# Patient Record
Sex: FEMALE | Race: BLACK OR AFRICAN AMERICAN | NOT HISPANIC OR LATINO
[De-identification: names, ages, dates, MRNs, and addresses within clinical notes are randomized per-mention and may not be internally consistent; named-entity substitution may affect disease eponyms.]

---

## 2020-01-10 ENCOUNTER — RESULT REVIEW (OUTPATIENT)
Age: 25
End: 2020-01-10

## 2022-02-10 ENCOUNTER — NON-APPOINTMENT (OUTPATIENT)
Age: 27
End: 2022-02-10

## 2022-02-14 PROBLEM — Z00.00 ENCOUNTER FOR PREVENTIVE HEALTH EXAMINATION: Status: ACTIVE | Noted: 2022-02-14

## 2022-02-16 ENCOUNTER — APPOINTMENT (OUTPATIENT)
Dept: ORTHOPEDIC SURGERY | Facility: CLINIC | Age: 27
End: 2022-02-16
Payer: COMMERCIAL

## 2022-02-16 ENCOUNTER — NON-APPOINTMENT (OUTPATIENT)
Age: 27
End: 2022-02-16

## 2022-02-16 VITALS — SYSTOLIC BLOOD PRESSURE: 121 MMHG | HEART RATE: 86 BPM | DIASTOLIC BLOOD PRESSURE: 83 MMHG

## 2022-02-16 VITALS — HEIGHT: 61 IN | BODY MASS INDEX: 32.28 KG/M2 | WEIGHT: 171 LBS

## 2022-02-16 VITALS — TEMPERATURE: 98.3 F

## 2022-02-16 DIAGNOSIS — M75.102 UNSPECIFIED ROTATOR CUFF TEAR OR RUPTURE OF LEFT SHOULDER, NOT SPECIFIED AS TRAUMATIC: ICD-10-CM

## 2022-02-16 DIAGNOSIS — Z72.89 OTHER PROBLEMS RELATED TO LIFESTYLE: ICD-10-CM

## 2022-02-16 DIAGNOSIS — Z78.9 OTHER SPECIFIED HEALTH STATUS: ICD-10-CM

## 2022-02-16 PROCEDURE — 99204 OFFICE O/P NEW MOD 45 MIN: CPT

## 2022-02-16 PROCEDURE — 73030 X-RAY EXAM OF SHOULDER: CPT | Mod: LT

## 2022-02-16 RX ORDER — DICLOFENAC SODIUM 50 MG/1
50 TABLET, DELAYED RELEASE ORAL
Qty: 60 | Refills: 1 | Status: ACTIVE | OUTPATIENT
Start: 2022-02-16
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
## 2022-02-16 NOTE — PHYSICAL EXAM
[de-identified] : Physical Examination\par General: well nourished, in no acute distress, alert and oriented to person, place and time\par Psychiatric: normal mood and affect, no abnormal movements or speech patterns\par Eyes: vision intact without glasses\par \par Musculoskeletal Examination\par Cervical spine	Full painless range of motion and negative Spurling's test\par \par Shoulder			Right			Left\par Appearance\par      Skin/Swelling/Deformity	normal			normal\par      Scapular Winging		-			-\par Range of Motion\par      Forward Flexion		170 / 170		170 / 170\par      Abduction			170 / 170		170 / 170\par      External Rotation		60			60\par      Internal Rotation		T10			T10\par      SAbd Ext Rotation		90			90\par      SAbd Int Rotation		80			80\par      Painful Arc			-			-\par      Crepitus			-			-\par Palpation\par      Clavicle			-			-\par      AC Joint			-			-\par      Posterior Acromion		-			-\par      Levator Scapula		-			-\par      Lateral Bursa			-			+\par      Impingement Area		-			+\par      Biceps Tendon		-			-\par      Anterior Capsule		-			-\par Strength Examination\par      Supraspinatous 		5+ / 0			5+ / +\par      Infraspinatous			5+ / 0			5+ / +\par      Subscapularis			5+ / 0			5+ / 0\par      Belly Press			5+ / 0			5+ / 0\par      Lift Off			-			-\par      Drop-Arm			-			-\par Special Examination\par      Biceps Brazos's		-			-\par      Impingement Neer		-			=\par      Impingement Hawking		-			=\par \par      Apprehension			-			-\par           Suppression Appre		-			-\par      Anterior Subluxation		-			-\par      Posterior Subluxation		-			-\par      AC Cross-Body\par           Anterior			-			-\par           Posterior			-			-\par \par Sensation\par      Axillary			normal			normal\par      LatAntCubBrach 		normal			normal\par      Median 			normal			normal\par      Ulnar 			normal			normal\par      Radial 			normal			normal\par Motor\par      AIN 				normal			normal\par      Ulnar 			normal			normal\par      Radial 			normal			normal\par      PIN 				normal			normal\par Pulses\par      Radial			2+			2+ [de-identified] : 4 views of the affected left shoulder (AP, Glenoid, Y-View, Axillary)\par 10-26-21 and evaluated by myself today and\par demonstrate:\par normal bony calcification without dislocation and no fracture\par 	Arch	[2B]\par 	AC Joint	[No] Arthrosis\par 	GH Joint	[No] Arthrosis\par 	Calcifications	[none]\par \par \par 4 views of the affected left shoulder (AP, Glenoid, Y-View, Axillary)\par were ordered, obtained and evaluated by myself today and\par demonstrate:\par normal bony calcification without dislocation and no fracture\par 	Arch	[2B]\par 	AC Joint	[No] Arthrosis\par 	GH Joint	[No] Arthrosis\par 	Calcifications	[none]\par \par

## 2022-02-16 NOTE — DISCUSSION/SUMMARY
[de-identified] : left shoulder rotator cuff syndrome\par I discussed my findings on history, exam and radiology.\par \par I reviewed the anatomy and function of the shoulder rotator cuff muscles and tendons, biceps tendon and labrum.Given the current findings for the patient, I recommend proceeding with advanced imaging to better characterize and diagnose the problem to aid patient care, management and treatment guidance as I suspect an internal injury to the knee not diagnosed on non-diagnostic plain radiographs causing the patients symptoms and physical examination to help provide surgical management planning.\par \par Therefore given the patients continued symptoms despite [ Non-operative management ] the patient has continued pain and weakness and impaired sleep affecting the patient's ADLs and limiting activities negatively affecting the patient's quality of life, examination and history consistent with internal shoulder derangement or rotator cuff injury with weakness of the rotator cuff muscles along with severe pain I am recommending obtaining advanced MRI imaging of the shoulder to identify damaged structures in order to facilite preopertive planning. I discussed with the patient that I am ordering an MRI to assess the soft tissue structures in the joint such as the joint capsule, ligaments, rotator cuff and biceps tendons, as well as to assess the cartilage, muscle bellies, cysts, AC joint edema or other pathology. The test will need insurance approval and will take place at a [facility] facility or outside facility. If the patient elects to obtain the MRI from an outside facility, the patient understands they have been instructed to bring in both the final radiologist read and a CD/DVD with the MRI images to allow review of the imaging test by myself during the follow up visit. I do not recommend obtaining an open standing MRI as the quality of the images is subpar and makes it difficult to diagnose intra-articular derangements and guide care discussion and decision making.\par \par The patient was prescribed Diclofenac PO non-steroidal anti-inflammatory medication. 50mg tablets twice daily to be taken for at least 1-2 weeks in a row and then PRN afterwards. Risks and benefits were discussed and include but not limited to renal damage and GI ulceration and bleeding.  They were advised to take with food to limit stomach upset as well as warned to stop the medication if worsening gastric pain or dizziness or other side effects. Also to immediately stop the medication and seek appropriate medical attention if any severe stomach ache, gastritis, black/red vomit, black/red stools or any other medical concern.\par \par Procedure Note:\par \par Verbal consent was obtained for bursal corticosteroid injection of the LEFT shoulder subacromial bursa, after the risks and benefits were discussed with the patient. Potential adverse effects were discussed including but not limited to bleeding, skin/ joint infection, local skin reactions including bleaching, bruising, stiffness, soreness, vasovagal episodes, transient hyperglycemia, avascular necrosis, pseudo-septic type reactions, post injection joint pain, allergic reaction to product or anesthetic and other rare but potential adverse effects along with benefits including decreased pain and improved stability prior to obtaining verbal informed consent. It was also discussed that for some patients the treatment is ineffective and there are no guarantees that the patient will experience improvement as the result of the injection. In rare occasions the injection can cause worsening of pain.\par \par The use of a Sonosite 15-6 MHz linear transducer with live ultrasound guidance of the shoulder was necessary given the patient's BMI and local body habitus overlying and obscuring the identification of normal body bony anatomy used to identify the injection site, the depth of soft tissue envelope necessitating a longer than normal needle to reach the bursal space, and to confirm the location of the needle tip within the bursa and localize delivery of the medication. Without the use of live ultrasound guidance the injection would have been more difficult to appropriate place and ensure accurate delivery of medication and place the patient's anatomy structures at risk for injury.\par \par The arm was placed in an adducted and external rotated position and the arm supported by a bump. The posterolateral injection site was identified using the ultrasound probe longitudianlly to identify the acromion boarder, the tuberosity, the rotator cuff tendons and the bursal space with internal and external rotation of the arm. The injection site was marked and prepped with a ChloraPrep swab and anesthetized with ethylchloride skin anesthesia. Using sterile technique a 22g 1 1/2 in spinal needle with 3 cc total of 1cc 1% lidocaine without epinephrine, 1cc 0.25% Marcaine without epinephrine and 1cc of 40mg/mL Kenalog was passed through the injection site towards the previously identified bursal space under live ultrasound guidance and noted to tip to be located in the appropriate plane. The medication was injected without resistance under live ultrasound visualization and noted to flow into the bursal space with its expansion noted on live ultrasound. The injection site was sterilely dressed, there was minimal blood loss. The patient tolerated this procedure without any complications done by myself. Images were recorded and saved.\par \par The patient has been advised that if they notice any worsening of symptoms or any problems to contact me and seek care from a qualified medical professional. The patient was instructed to ice the shoulder and take NSAID medication on an as needed basis if the patient feels discomfort.\par \par \par The patient verifies their understanding the the visit, diagnosis and plan. They agree with the treatment plan and will contact the office with any questions or problems.\par \par FU after MRI completed

## 2024-02-27 ENCOUNTER — ASOB RESULT (OUTPATIENT)
Age: 29
End: 2024-02-27

## 2024-02-27 ENCOUNTER — APPOINTMENT (OUTPATIENT)
Dept: ANTEPARTUM | Facility: CLINIC | Age: 29
End: 2024-02-27
Payer: COMMERCIAL

## 2024-02-27 ENCOUNTER — LABORATORY RESULT (OUTPATIENT)
Age: 29
End: 2024-02-27

## 2024-02-27 PROCEDURE — 76813 OB US NUCHAL MEAS 1 GEST: CPT

## 2024-02-27 PROCEDURE — 36415 COLL VENOUS BLD VENIPUNCTURE: CPT

## 2024-02-27 PROCEDURE — 76801 OB US < 14 WKS SINGLE FETUS: CPT | Mod: 59

## 2024-03-07 ENCOUNTER — NON-APPOINTMENT (OUTPATIENT)
Age: 29
End: 2024-03-07

## 2024-03-25 ENCOUNTER — APPOINTMENT (OUTPATIENT)
Dept: MATERNAL FETAL MEDICINE | Facility: CLINIC | Age: 29
End: 2024-03-25
Payer: COMMERCIAL

## 2024-03-25 ENCOUNTER — ASOB RESULT (OUTPATIENT)
Age: 29
End: 2024-03-25

## 2024-03-25 PROCEDURE — 99205 OFFICE O/P NEW HI 60 MIN: CPT | Mod: 95

## 2024-04-08 ENCOUNTER — TRANSCRIPTION ENCOUNTER (OUTPATIENT)
Age: 29
End: 2024-04-08

## 2024-04-26 ENCOUNTER — APPOINTMENT (OUTPATIENT)
Dept: ANTEPARTUM | Facility: CLINIC | Age: 29
End: 2024-04-26
Payer: COMMERCIAL

## 2024-04-26 ENCOUNTER — ASOB RESULT (OUTPATIENT)
Age: 29
End: 2024-04-26

## 2024-04-26 PROCEDURE — 76811 OB US DETAILED SNGL FETUS: CPT

## 2024-05-08 ENCOUNTER — ASOB RESULT (OUTPATIENT)
Age: 29
End: 2024-05-08

## 2024-05-08 ENCOUNTER — APPOINTMENT (OUTPATIENT)
Dept: MATERNAL FETAL MEDICINE | Facility: CLINIC | Age: 29
End: 2024-05-08
Payer: COMMERCIAL

## 2024-05-08 PROCEDURE — 99214 OFFICE O/P EST MOD 30 MIN: CPT | Mod: 95

## 2024-05-10 ENCOUNTER — NON-APPOINTMENT (OUTPATIENT)
Age: 29
End: 2024-05-10

## 2024-06-05 ENCOUNTER — OUTPATIENT (OUTPATIENT)
Dept: OUTPATIENT SERVICES | Facility: HOSPITAL | Age: 29
LOS: 1 days | End: 2024-06-05
Payer: COMMERCIAL

## 2024-06-05 ENCOUNTER — APPOINTMENT (OUTPATIENT)
Dept: PEDIATRIC ALLERGY IMMUNOLOGY | Facility: CLINIC | Age: 29
End: 2024-06-05
Payer: COMMERCIAL

## 2024-06-05 DIAGNOSIS — Z87.19 PERSONAL HISTORY OF OTHER DISEASES OF THE DIGESTIVE SYSTEM: ICD-10-CM

## 2024-06-05 DIAGNOSIS — Z00.00 ENCOUNTER FOR GENERAL ADULT MEDICAL EXAMINATION WITHOUT ABNORMAL FINDINGS: ICD-10-CM

## 2024-06-05 DIAGNOSIS — Z11.3 ENCOUNTER FOR SCREENING FOR INFECTIONS WITH A PREDOMINANTLY SEXUAL MODE OF TRANSMISSION: ICD-10-CM

## 2024-06-05 DIAGNOSIS — Z29.9 ENCOUNTER FOR PROPHYLACTIC MEASURES, UNSPECIFIED: ICD-10-CM

## 2024-06-05 DIAGNOSIS — Z29.81 ENCOUNTER FOR HIV PRE-EXPOSURE PROPHYLAXIS: ICD-10-CM

## 2024-06-05 PROCEDURE — 85025 COMPLETE CBC W/AUTO DIFF WBC: CPT

## 2024-06-05 PROCEDURE — 80053 COMPREHEN METABOLIC PANEL: CPT

## 2024-06-05 PROCEDURE — 86696 HERPES SIMPLEX TYPE 2 TEST: CPT

## 2024-06-05 PROCEDURE — 36415 COLL VENOUS BLD VENIPUNCTURE: CPT

## 2024-06-05 PROCEDURE — 86780 TREPONEMA PALLIDUM: CPT

## 2024-06-05 PROCEDURE — 87491 CHLMYD TRACH DNA AMP PROBE: CPT

## 2024-06-05 PROCEDURE — 99205 OFFICE O/P NEW HI 60 MIN: CPT

## 2024-06-05 PROCEDURE — 86695 HERPES SIMPLEX TYPE 1 TEST: CPT

## 2024-06-05 PROCEDURE — 87591 N.GONORRHOEAE DNA AMP PROB: CPT

## 2024-06-05 PROCEDURE — G0463: CPT

## 2024-06-05 PROCEDURE — 87536 HIV-1 QUANT&REVRSE TRNSCRPJ: CPT

## 2024-06-05 PROCEDURE — 87389 HIV-1 AG W/HIV-1&-2 AB AG IA: CPT

## 2024-06-05 NOTE — HISTORY OF PRESENT ILLNESS
[FreeTextEntry1] : Patient presents to initiate PrEP   Patient is in a relationship with a man who is HIV+ Reportedly he is on medication and is undetectable She is 26 weeks pregnant with second baby, first just turned 5 VALENTE 9/10/2024 First was delivered via emergent  - she is hoping for  this time around  FOB is +  He has been undetectable on meds to her knowledge since their relationship began  Her last HIV test was this past weekend, and it was negative - AG/AB test  Her last sex with partner was in December, around the time she conceived, they had largely not been using condoms  H/o colitis  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

## 2024-06-05 NOTE — ASSESSMENT
[FreeTextEntry1] : This is a 28 year old cis female patient presenting at 26 weeks gestation. She is currently in a monogamous relationship with an HIV+ male who is reportedly undetectable on medication (he was perinatally infected). They have not been active since she found out she was pregnant in December, they agreed to this together in order to avoid any risk of transmission. She has been tested negative numerous times throughout her life including as recently as 4 days prior, with ag/ab testing (has result on her phone). Discussed risks, benefits, and alternatives, of taking PrEP while pregnant and./or breast feeding. Discussed other methods of risk reduction. At this time, the patient wishes to defer PrEP until after delivery, and continue to refrain from sexual activity. Patient partner is following up with his provider to ensure he remains undetectable. Discussed if she wishes to refrain from PrEP but engage in activity to use barrier methods. she verbalized understanding and will RTC in the fall after delivery to initiate PrEP. Repeat HIV testing today, amenable to other STI testing as well.  Provided health education/risk reduction services to the patient at todays visit includin Discussion of Sexual Health 330 Risk Reduction/Education 346 Health Education  Individual

## 2024-06-05 NOTE — SOCIAL HISTORY
[Sexually Active] : The patient is sexually active [Monogamous] : monogamous [Never] : never [Vaginal] : vaginal [HIV Risk Factors: Heterosexual contact] : with naproxen sodium

## 2024-06-05 NOTE — DISCUSSION/SUMMARY
[15 min] : 15 min [HIV Education] : HIV Education [Universal Precautions] : universal precautions [Treatment Education] : treatment education [Treatment Adherence] : treatment adherence [Anticipatory Guidance] : anticipatory guidance [Prognosis] : prognosis [Risk Reduction] : risk reduction [Pre-conception Counseling] : pre-conception counseling [Sexuality/Safer Sex] : sexuality/safer sex [Family Planning] : family planning

## 2024-06-06 LAB
ALBUMIN SERPL ELPH-MCNC: 3.4 G/DL
ALP BLD-CCNC: 121 U/L
ALT SERPL-CCNC: 6 U/L
ANION GAP SERPL CALC-SCNC: 11 MMOL/L
AST SERPL-CCNC: 10 U/L
BASOPHILS # BLD AUTO: 0.04 K/UL
BASOPHILS NFR BLD AUTO: 0.4 %
BILIRUB SERPL-MCNC: 0.2 MG/DL
BUN SERPL-MCNC: 9 MG/DL
CALCIUM SERPL-MCNC: 9 MG/DL
CHLORIDE SERPL-SCNC: 106 MMOL/L
CO2 SERPL-SCNC: 21 MMOL/L
CREAT SERPL-MCNC: 0.57 MG/DL
EGFR: 125 ML/MIN/1.73M2
EOSINOPHIL # BLD AUTO: 0.15 K/UL
EOSINOPHIL NFR BLD AUTO: 1.5 %
GLUCOSE SERPL-MCNC: 76 MG/DL
HCT VFR BLD CALC: 33 %
HGB BLD-MCNC: 10.8 G/DL
HIV1 RNA # SERPL NAA+PROBE: NORMAL
HIV1 RNA # SERPL NAA+PROBE: NORMAL COPIES/ML
HIV1+2 AB SPEC QL IA.RAPID: NONREACTIVE
HSV 1+2 IGG SER IA-IMP: NEGATIVE
HSV 1+2 IGG SER IA-IMP: POSITIVE
HSV1 IGG SER QL: 4.51 INDEX
HSV2 IGG SER QL: 0.04 INDEX
IMM GRANULOCYTES NFR BLD AUTO: 0.4 %
LYMPHOCYTES # BLD AUTO: 2.27 K/UL
LYMPHOCYTES NFR BLD AUTO: 22.3 %
MAN DIFF?: NORMAL
MCHC RBC-ENTMCNC: 29.6 PG
MCHC RBC-ENTMCNC: 32.7 GM/DL
MCV RBC AUTO: 90.4 FL
MONOCYTES # BLD AUTO: 0.61 K/UL
MONOCYTES NFR BLD AUTO: 6 %
NEUTROPHILS # BLD AUTO: 7.09 K/UL
NEUTROPHILS NFR BLD AUTO: 69.4 %
PLATELET # BLD AUTO: 233 K/UL
POTASSIUM SERPL-SCNC: 4 MMOL/L
PROT SERPL-MCNC: 6.3 G/DL
RBC # BLD: 3.65 M/UL
RBC # FLD: 13.3 %
SODIUM SERPL-SCNC: 138 MMOL/L
T PALLIDUM AB SER QL IA: NEGATIVE
VIRAL LOAD INTERP: NORMAL
VIRAL LOAD LOG: NORMAL LG COP/ML
WBC # FLD AUTO: 10.2 K/UL

## 2024-06-07 LAB
C TRACH RRNA SPEC QL NAA+PROBE: NOT DETECTED
C TRACH RRNA SPEC QL NAA+PROBE: NOT DETECTED
N GONORRHOEA RRNA SPEC QL NAA+PROBE: NOT DETECTED
N GONORRHOEA RRNA SPEC QL NAA+PROBE: NOT DETECTED
SOURCE AMPLIFICATION: NORMAL
SOURCE ORAL: NORMAL

## 2024-06-11 LAB
HSV 1 IGG TYPE-SPECIFIC AB: 4.54 INDEX
HSV 2 IGG TYPE-SPECIFIC AB: <0.9 INDEX

## 2024-06-13 DIAGNOSIS — Z11.3 ENCOUNTER FOR SCREENING FOR INFECTIONS WITH A PREDOMINANTLY SEXUAL MODE OF TRANSMISSION: ICD-10-CM

## 2024-06-13 DIAGNOSIS — Z29.9 ENCOUNTER FOR PROPHYLACTIC MEASURES, UNSPECIFIED: ICD-10-CM

## 2024-07-07 ENCOUNTER — RESULT REVIEW (OUTPATIENT)
Age: 29
End: 2024-07-07

## 2024-07-07 ENCOUNTER — OUTPATIENT (OUTPATIENT)
Dept: INPATIENT UNIT | Facility: HOSPITAL | Age: 29
LOS: 1 days | Discharge: HOME | End: 2024-07-07
Payer: COMMERCIAL

## 2024-07-07 VITALS
HEART RATE: 94 BPM | SYSTOLIC BLOOD PRESSURE: 121 MMHG | TEMPERATURE: 98 F | DIASTOLIC BLOOD PRESSURE: 53 MMHG | RESPIRATION RATE: 17 BRPM

## 2024-07-07 DIAGNOSIS — Z98.890 OTHER SPECIFIED POSTPROCEDURAL STATES: Chronic | ICD-10-CM

## 2024-07-07 DIAGNOSIS — O26.899 OTHER SPECIFIED PREGNANCY RELATED CONDITIONS, UNSPECIFIED TRIMESTER: ICD-10-CM

## 2024-07-07 LAB
APPEARANCE UR: ABNORMAL
APPEARANCE UR: ABNORMAL
BACTERIA # UR AUTO: ABNORMAL /HPF
BILIRUB UR-MCNC: NEGATIVE — SIGNIFICANT CHANGE UP
BILIRUB UR-MCNC: NEGATIVE — SIGNIFICANT CHANGE UP
CAST: 2 /LPF — SIGNIFICANT CHANGE UP (ref 0–4)
COD CRY URNS QL: PRESENT
COLOR SPEC: SIGNIFICANT CHANGE UP
COLOR SPEC: YELLOW — SIGNIFICANT CHANGE UP
DIFF PNL FLD: NEGATIVE — SIGNIFICANT CHANGE UP
DIFF PNL FLD: NEGATIVE — SIGNIFICANT CHANGE UP
GLUCOSE UR QL: NEGATIVE MG/DL — SIGNIFICANT CHANGE UP
GLUCOSE UR QL: NEGATIVE MG/DL — SIGNIFICANT CHANGE UP
KETONES UR-MCNC: ABNORMAL MG/DL
KETONES UR-MCNC: NEGATIVE MG/DL — SIGNIFICANT CHANGE UP
LEUKOCYTE ESTERASE UR-ACNC: ABNORMAL
LEUKOCYTE ESTERASE UR-ACNC: NEGATIVE — SIGNIFICANT CHANGE UP
NITRITE UR-MCNC: NEGATIVE — SIGNIFICANT CHANGE UP
NITRITE UR-MCNC: NEGATIVE — SIGNIFICANT CHANGE UP
PH UR: 6.5 — SIGNIFICANT CHANGE UP (ref 5–8)
PH UR: 7.5 — SIGNIFICANT CHANGE UP (ref 5–8)
PROT UR-MCNC: 30 MG/DL
PROT UR-MCNC: NEGATIVE MG/DL — SIGNIFICANT CHANGE UP
RBC CASTS # UR COMP ASSIST: 11 /HPF — HIGH (ref 0–4)
REVIEW: SIGNIFICANT CHANGE UP
SP GR SPEC: 1.02 — SIGNIFICANT CHANGE UP (ref 1–1.03)
SP GR SPEC: 1.03 — HIGH (ref 1–1.03)
SQUAMOUS # UR AUTO: 10 /HPF — HIGH (ref 0–5)
UROBILINOGEN FLD QL: 1 MG/DL — SIGNIFICANT CHANGE UP (ref 0.2–1)
UROBILINOGEN FLD QL: 1 MG/DL — SIGNIFICANT CHANGE UP (ref 0.2–1)
WBC UR QL: 36 /HPF — HIGH (ref 0–5)

## 2024-07-07 PROCEDURE — 59025 FETAL NON-STRESS TEST: CPT | Mod: 26

## 2024-07-07 PROCEDURE — 99221 1ST HOSP IP/OBS SF/LOW 40: CPT | Mod: 25

## 2024-07-07 RX ORDER — DEXTROSE MONOHYDRATE AND SODIUM CHLORIDE 5; .3 G/100ML; G/100ML
1000 INJECTION, SOLUTION INTRAVENOUS ONCE
Refills: 0 | Status: COMPLETED | OUTPATIENT
Start: 2024-07-07 | End: 2024-07-07

## 2024-07-07 RX ORDER — PRENATAL VIT/IRON FUM/FOLIC AC 60 MG-1 MG
1 TABLET ORAL
Refills: 0 | DISCHARGE

## 2024-07-07 RX ORDER — ACETAMINOPHEN 325 MG
1000 TABLET ORAL ONCE
Refills: 0 | Status: COMPLETED | OUTPATIENT
Start: 2024-07-07 | End: 2024-07-07

## 2024-07-07 RX ORDER — OMEPRAZOLE 10 MG/1
1 CAPSULE, DELAYED RELEASE ORAL
Refills: 0 | DISCHARGE

## 2024-07-07 RX ADMIN — Medication 1000 MILLIGRAM(S): at 23:49

## 2024-07-07 RX ADMIN — DEXTROSE MONOHYDRATE AND SODIUM CHLORIDE 1000 MILLILITER(S): 5; .3 INJECTION, SOLUTION INTRAVENOUS at 23:50

## 2024-07-07 RX ADMIN — Medication 1000 MILLIGRAM(S): at 23:20

## 2024-07-07 NOTE — OB PROVIDER TRIAGE NOTE - PLAN OF CARE
D/w Dr Ho  Discharge home with instructions   labor precautions  Patient to monitor fetal kick counts  Pt to follow up with OB as scheduled

## 2024-07-07 NOTE — OB PROVIDER TRIAGE NOTE - NSHPPHYSICALEXAM_GEN_ALL_CORE
T(C): 36.7 (07-07-24 @ 21:31), Max: 36.7 (07-07-24 @ 21:31)  HR: 93 (07-07-24 @ 22:22) (90 - 97)  BP: 116/68 (07-07-24 @ 22:22) (114/59 - 121/53)  RR: 17 (07-07-24 @ 21:31) (17 - 17)    Heart: RRR  Lungs: CTA  Abdomen: Gravid, soft, NT    NST: Reactive with moderate variability, Category 1 tracing  Richards: Irregular contractions not felt by patient  TAS: SLIUP, cephalic, posterior placenta, LEONOR 19, BPP 8/8  TVS: Cervix 4.2 - 4.8cm long, no funneling or dynamic changes       Images saved in [facility]  VE: @ 10:55 Long/closed/ant/high   Repeat VE @ 2am unchanged

## 2024-07-07 NOTE — OB RN TRIAGE NOTE - NS_DISPOSITION_OBGYN_ALL_OB
Pre-op Diagnosis: Spinal Stenosis, Degenerative Disc Disease -  left L5 radiculitis    Post op: the same    Procedure: Caudal Epidural steroid injection under fluroscopic guidance    After informed consent was obtained, patient was positioned prone on the procedure table. female low back area was prepped and draped in a sterile fashion. With the aide of fluoroscopy using lateral fluoroscopic view, after local anesthesia, 5 cc of 1% lidocaine, 22 gauge ,3.5 inch spinal needle was placed in the caudal canal.  Placement was confirmed by negative aspiration of blood and CSF and by injecting 2 cc of Isovue dye under real time fluoroscopy that showed excellent spread of the contrast in the epidural space with deliniation of fthe sacral and lumber nerve roots. Mixture of 2 cc of 0.25% Bupivacaine and 80 mg of Depomedrol was injected. There were no complications. Patient tolerated the procedure well and was discharged after appropriate observation.   Fluoroscopy time:  5 sec    Kt Dudley MD  7/15/2022 Continue to Observe Discharge

## 2024-07-07 NOTE — OB PROVIDER TRIAGE NOTE - NS_FETALHEARTRATE_OBGYN_ALL_OB_FT
155 Ear Star Wedge Flap Text: The defect edges were debeveled with a #15 blade scalpel.  Given the location of the defect and the proximity to free margins (helical rim) an ear star wedge flap was deemed most appropriate.  Using a sterile surgical marker, the appropriate flap was drawn incorporating the defect and placing the expected incisions between the helical rim and antihelix where possible.  The area thus outlined was incised through and through with a #15 scalpel blade.

## 2024-07-07 NOTE — OB PROVIDER TRIAGE NOTE - HISTORY OF PRESENT ILLNESS
28y  at 30w5d presents to triage c/o constant right sided back pain since last night unrelieved with tylenol. Pt denies any urinary symptoms. Denies any fever, no chills, no nausea, no vomiting. Denies feeling any cramping or contractions.  Reports +FM, no vaginal bleeding, no ROM or LOF  Prenatal care: Dr Camejo, denies any prenatal complications so far.  Pt with h/o prior C/section for failure to progress.

## 2024-07-07 NOTE — OB PROVIDER TRIAGE NOTE - NSHPLABSRESULTS_GEN_ALL_CORE
LABS:                       10.8   11.37 )-----------( 234      ( 2024 23:35 )             33.5     Urinalysis Basic - ( 2024 23:40 )    Color: Yellow / Appearance: Cloudy / S.024 / pH: x  Gluc: x / Ketone: Negative mg/dL  / Bili: Negative / Urobili: 1.0 mg/dL   Blood: x / Protein: Negative mg/dL / Nitrite: Negative   Leuk Esterase: Negative / RBC: 2 /HPF / WBC 4 /HPF   Sq Epi: x / Non Sq Epi: 7 /HPF / Bacteria: Negative /HPF

## 2024-07-07 NOTE — OB RN TRIAGE NOTE - FALL HARM RISK - FALLEN IN PAST
Topical Steroids Counseling: I discussed with the patient that prolonged use of topical steroids can result in the increased appearance of superficial blood vessels (telangiectasias), lightening (hypopigmentation) and thinning of the skin (atrophy).  Patient understands to avoid using high potency steroids in skin folds, the groin or the face.  The patient verbalized understanding of the proper use and possible adverse effects of topical steroids.  All of the patient's questions and concerns were addressed.
Detail Level: Simple
No

## 2024-07-07 NOTE — OB RN TRIAGE NOTE - LIVING CHILDREN, OB PROFILE
1
Quality 226: Preventive Care And Screening: Tobacco Use: Screening And Cessation Intervention: Patient screened for tobacco use and is an ex/non-smoker
Detail Level: Detailed
Quality 110: Preventive Care And Screening: Influenza Immunization: Influenza Immunization Administered during Influenza season

## 2024-07-08 VITALS — HEART RATE: 85 BPM | DIASTOLIC BLOOD PRESSURE: 52 MMHG | SYSTOLIC BLOOD PRESSURE: 90 MMHG

## 2024-07-08 LAB
BACTERIA # UR AUTO: NEGATIVE /HPF — SIGNIFICANT CHANGE UP
BASOPHILS # BLD AUTO: 0.03 K/UL — SIGNIFICANT CHANGE UP (ref 0–0.2)
BASOPHILS NFR BLD AUTO: 0.3 % — SIGNIFICANT CHANGE UP (ref 0–2)
CAST: 1 /LPF — SIGNIFICANT CHANGE UP (ref 0–4)
EOSINOPHIL # BLD AUTO: 0.18 K/UL — SIGNIFICANT CHANGE UP (ref 0–0.5)
EOSINOPHIL NFR BLD AUTO: 1.6 % — SIGNIFICANT CHANGE UP (ref 0–6)
HCT VFR BLD CALC: 33.5 % — LOW (ref 34.5–45)
HGB BLD-MCNC: 10.8 G/DL — LOW (ref 11.5–15.5)
IANC: 7.38 K/UL — SIGNIFICANT CHANGE UP (ref 1.8–7.4)
IMM GRANULOCYTES NFR BLD AUTO: 0.3 % — SIGNIFICANT CHANGE UP (ref 0–0.9)
LYMPHOCYTES # BLD AUTO: 2.98 K/UL — SIGNIFICANT CHANGE UP (ref 1–3.3)
LYMPHOCYTES # BLD AUTO: 26.2 % — SIGNIFICANT CHANGE UP (ref 13–44)
MCHC RBC-ENTMCNC: 28.3 PG — SIGNIFICANT CHANGE UP (ref 27–34)
MCHC RBC-ENTMCNC: 32.2 GM/DL — SIGNIFICANT CHANGE UP (ref 32–36)
MCV RBC AUTO: 87.9 FL — SIGNIFICANT CHANGE UP (ref 80–100)
MONOCYTES # BLD AUTO: 0.77 K/UL — SIGNIFICANT CHANGE UP (ref 0–0.9)
MONOCYTES NFR BLD AUTO: 6.8 % — SIGNIFICANT CHANGE UP (ref 2–14)
NEUTROPHILS # BLD AUTO: 7.38 K/UL — SIGNIFICANT CHANGE UP (ref 1.8–7.4)
NEUTROPHILS NFR BLD AUTO: 64.8 % — SIGNIFICANT CHANGE UP (ref 43–77)
NRBC # BLD: 0 /100 WBCS — SIGNIFICANT CHANGE UP (ref 0–0)
NRBC # FLD: 0 K/UL — SIGNIFICANT CHANGE UP (ref 0–0)
PLATELET # BLD AUTO: 234 K/UL — SIGNIFICANT CHANGE UP (ref 150–400)
RBC # BLD: 3.81 M/UL — SIGNIFICANT CHANGE UP (ref 3.8–5.2)
RBC # FLD: 12.7 % — SIGNIFICANT CHANGE UP (ref 10.3–14.5)
RBC CASTS # UR COMP ASSIST: 2 /HPF — SIGNIFICANT CHANGE UP (ref 0–4)
REVIEW: SIGNIFICANT CHANGE UP
SQUAMOUS # UR AUTO: 7 /HPF — HIGH (ref 0–5)
WBC # BLD: 11.37 K/UL — HIGH (ref 3.8–10.5)
WBC # FLD AUTO: 11.37 K/UL — HIGH (ref 3.8–10.5)
WBC UR QL: 4 /HPF — SIGNIFICANT CHANGE UP (ref 0–5)

## 2024-07-08 PROCEDURE — 76770 US EXAM ABDO BACK WALL COMP: CPT | Mod: 26

## 2024-07-08 RX ORDER — DEXTROSE MONOHYDRATE AND SODIUM CHLORIDE 5; .3 G/100ML; G/100ML
1000 INJECTION, SOLUTION INTRAVENOUS ONCE
Refills: 0 | Status: DISCONTINUED | OUTPATIENT
Start: 2024-07-08 | End: 2024-07-22

## 2024-07-09 DIAGNOSIS — M54.9 DORSALGIA, UNSPECIFIED: ICD-10-CM

## 2024-07-09 DIAGNOSIS — O34.219 MATERNAL CARE FOR UNSPECIFIED TYPE SCAR FROM PREVIOUS CESAREAN DELIVERY: ICD-10-CM

## 2024-07-09 DIAGNOSIS — Z3A.30 30 WEEKS GESTATION OF PREGNANCY: ICD-10-CM

## 2024-07-09 DIAGNOSIS — K52.839 MICROSCOPIC COLITIS, UNSPECIFIED: ICD-10-CM

## 2024-07-09 DIAGNOSIS — O99.613 DISEASES OF THE DIGESTIVE SYSTEM COMPLICATING PREGNANCY, THIRD TRIMESTER: ICD-10-CM

## 2024-07-09 DIAGNOSIS — Z87.59 PERSONAL HISTORY OF OTHER COMPLICATIONS OF PREGNANCY, CHILDBIRTH AND THE PUERPERIUM: ICD-10-CM

## 2024-07-09 DIAGNOSIS — O99.891 OTHER SPECIFIED DISEASES AND CONDITIONS COMPLICATING PREGNANCY: ICD-10-CM

## 2024-07-09 LAB
CULTURE RESULTS: SIGNIFICANT CHANGE UP
SPECIMEN SOURCE: SIGNIFICANT CHANGE UP

## 2024-07-11 ENCOUNTER — OUTPATIENT (OUTPATIENT)
Dept: OUTPATIENT SERVICES | Facility: HOSPITAL | Age: 29
LOS: 1 days | End: 2024-07-11
Payer: COMMERCIAL

## 2024-07-11 VITALS
TEMPERATURE: 98 F | HEART RATE: 100 BPM | SYSTOLIC BLOOD PRESSURE: 100 MMHG | RESPIRATION RATE: 16 BRPM | DIASTOLIC BLOOD PRESSURE: 63 MMHG | OXYGEN SATURATION: 100 %

## 2024-07-11 DIAGNOSIS — Z98.890 OTHER SPECIFIED POSTPROCEDURAL STATES: Chronic | ICD-10-CM

## 2024-07-11 DIAGNOSIS — O26.899 OTHER SPECIFIED PREGNANCY RELATED CONDITIONS, UNSPECIFIED TRIMESTER: ICD-10-CM

## 2024-07-11 DIAGNOSIS — Z98.891 HISTORY OF UTERINE SCAR FROM PREVIOUS SURGERY: Chronic | ICD-10-CM

## 2024-07-11 PROBLEM — K52.839 MICROSCOPIC COLITIS, UNSPECIFIED: Chronic | Status: ACTIVE | Noted: 2024-07-07

## 2024-07-11 LAB
APPEARANCE UR: CLEAR — SIGNIFICANT CHANGE UP
BACTERIA # UR AUTO: NEGATIVE /HPF — SIGNIFICANT CHANGE UP
BILIRUB UR-MCNC: NEGATIVE — SIGNIFICANT CHANGE UP
COD CRY URNS QL: PRESENT
COLOR SPEC: SIGNIFICANT CHANGE UP
COMMENT - URINE: SIGNIFICANT CHANGE UP
DIFF PNL FLD: NEGATIVE — SIGNIFICANT CHANGE UP
EPI CELLS # UR: PRESENT
FIBRONECTIN FETAL SPEC QL: NEGATIVE — SIGNIFICANT CHANGE UP
GLUCOSE UR QL: NEGATIVE MG/DL — SIGNIFICANT CHANGE UP
KETONES UR-MCNC: ABNORMAL MG/DL
LEUKOCYTE ESTERASE UR-ACNC: NEGATIVE — SIGNIFICANT CHANGE UP
NITRITE UR-MCNC: NEGATIVE — SIGNIFICANT CHANGE UP
PH UR: 6.5 — SIGNIFICANT CHANGE UP (ref 5–8)
PROT UR-MCNC: ABNORMAL MG/DL
RBC CASTS # UR COMP ASSIST: 0 /HPF — SIGNIFICANT CHANGE UP (ref 0–4)
SP GR SPEC: 1.03 — SIGNIFICANT CHANGE UP (ref 1–1.03)
UROBILINOGEN FLD QL: 1 MG/DL — SIGNIFICANT CHANGE UP (ref 0.2–1)
WBC UR QL: 2 /HPF — SIGNIFICANT CHANGE UP (ref 0–5)

## 2024-07-11 PROCEDURE — G0463: CPT

## 2024-07-11 PROCEDURE — 59025 FETAL NON-STRESS TEST: CPT

## 2024-07-11 PROCEDURE — 81001 URINALYSIS AUTO W/SCOPE: CPT

## 2024-07-11 PROCEDURE — 99213 OFFICE O/P EST LOW 20 MIN: CPT

## 2024-07-11 PROCEDURE — 82731 ASSAY OF FETAL FIBRONECTIN: CPT

## 2024-07-11 PROCEDURE — 96360 HYDRATION IV INFUSION INIT: CPT

## 2024-07-11 RX ORDER — DEXTROSE MONOHYDRATE AND SODIUM CHLORIDE 5; .3 G/100ML; G/100ML
1000 INJECTION, SOLUTION INTRAVENOUS
Refills: 0 | Status: ACTIVE | OUTPATIENT
Start: 2024-07-11 | End: 2025-06-09

## 2024-07-11 RX ADMIN — DEXTROSE MONOHYDRATE AND SODIUM CHLORIDE 125 MILLILITER(S): 5; .3 INJECTION, SOLUTION INTRAVENOUS at 11:30

## 2024-07-11 NOTE — OB PROVIDER TRIAGE NOTE - NSOBPROVIDERNOTE_OBGYN_ALL_OB_FT
27yo  pt at 31 weeks c/o contractions  Urine dark  cervix closed and firm with no signs of premature labor  - IV hydration  - UA/UC sent  - FFN sent    Dr. Nagy aware 27yo  pt at 31 weeks with no evidence of  labor.  FFN negative.  Reports feeling better after IV hydration.      Reassuring fetal status       Dr. Nagy aware

## 2024-07-11 NOTE — OB RN TRIAGE NOTE - NSICDXNOFAMILYHX_GEN_ALL_CORE
Pt admitted for dyspnea and hypoxia, currently maintained on high-flow NC. Differential includes COPD exacerbation, possibly in s/o underlying infection given b/l ground glass opacities on CT chest and elevated procalcitonin, although pt afebrile w/out leukocytosis. Also considered is new CHF given elevated BNP and that CT chest findings may represent pulmonary edema, although echo here w/ hyperdynamic LV systolic function and nml diastolic function. CT findings may also represent pneumonitis in s/o chemo/immunotherapy tx. Unlikely PE as CTA chest negative for acute thromboembolism.    Plan:  -Continuous pulse ox  -High-flow NC for now, wean as tolerated  -Low threshold to start on BiPAP if pt's saturations do not improve  -CTA chest w/ evidence of chronic thromboembolic disease but no acute PE, also w/ b/l symmetric ground-glass opacities  -Echo performed here w/ hyperdynamic LV systolic function, nml diastolic function  -RVP negative, COVID-19 negative x 2, procalcitonin elevated at 15 (6/15)  -Blood cx NGTD, sputum cx w/ nml respiratory eleanor  -Fungitell negative  -MICU consulted, recs appreciated  -Pulm consulted, recs appreciated  -S/p Zosyn (completed 6/22)  -Started on Lasix 40mg IV daily, will continue  -Continue w/ Duoneb  -S/p IV solumedrol 1mg/kg BID, now weaning on Prednisone  -VBG lactate elevated on admission, downtrended Pt admitted for dyspnea and hypoxia, currently maintained on high-flow NC. Differential includes COPD exacerbation, possibly in s/o underlying infection given b/l ground glass opacities on CT chest and elevated procalcitonin, although pt afebrile w/out leukocytosis. Also considered is new CHF given elevated BNP and that CT chest findings may represent pulmonary edema, although echo here w/ hyperdynamic LV systolic function and nml diastolic function. CT findings may also represent pneumonitis in s/o chemo/immunotherapy tx. Unlikely PE as CTA chest negative for acute thromboembolism.    Plan:  -Continuous pulse ox  -High-flow NC for now, wean as tolerated  -Low threshold to start on BiPAP if pt's saturations do not improve  -CTA chest w/ evidence of chronic thromboembolic disease but no acute PE, also w/ b/l symmetric ground-glass opacities  -Echo performed here w/ hyperdynamic LV systolic function, nml diastolic function  -RVP negative, COVID-19 negative x 2, procalcitonin elevated at 15 (6/15)  -Blood cx NGTD, sputum cx w/ nml respiratory eleanor  -Fungitell negative  -MICU consulted, recs appreciated  -Pulm consulted, recs appreciated  -S/p Zosyn (completed 6/22)  -Started on Lasix 40mg IV daily, will continue  -Continue w/ Duoneb  -S/p IV solumedrol 1mg/kg BID, now weaning on Prednisone   -VBG lactate elevated on admission, downtrended Pt admitted for dyspnea and hypoxia, currently maintained on high-flow NC. Differential includes COPD exacerbation, possibly in s/o underlying infection given b/l ground glass opacities on CT chest and elevated procalcitonin, although pt afebrile w/out leukocytosis. Also considered is new CHF given elevated BNP and that CT chest findings may represent pulmonary edema, although echo here w/ hyperdynamic LV systolic function and nml diastolic function. CT findings may also represent pneumonitis in s/o chemo/immunotherapy tx. Unlikely PE as CTA chest negative for acute thromboembolism.    Plan:  -Continuous pulse ox  -High-flow NC for now, wean as tolerated  -Low threshold to start on BiPAP if pt's saturations do not improve  -CTA chest w/ evidence of chronic thromboembolic disease but no acute PE, also w/ b/l symmetric ground-glass opacities  -Echo performed here w/ hyperdynamic LV systolic function, nml diastolic function  -RVP negative, COVID-19 negative x 2, procalcitonin elevated at 15 (6/15)  -Blood cx NGTD, sputum cx w/ nml respiratory eleanor  -Fungitell negative  -MICU consulted, recs appreciated  -Pulm consulted, recs appreciated  -S/p Zosyn (completed 6/22)  -Started on Lasix 40mg PO daily, will continue  -Continue w/ Duoneb  -S/p IV solumedrol 1mg/kg BID, now weaning on Prednisone at 50mg, reduce by 10mg q5d  -VBG lactate elevated on admission, downtrended <-- Click to add NO pertinent Family History

## 2024-07-11 NOTE — OB PROVIDER TRIAGE NOTE - NSHPPHYSICALEXAM_GEN_ALL_CORE
Vital Signs Last 24 Hrs  T(C): 36.4 (2024 09:51), Max: 36.4 (2024 09:51)  T(F): 97.6 (2024 09:51), Max: 97.6 (2024 09:51)  HR: 78 (2024 09:51) (78 - 78)  BP: 114/68 (2024 09:51) (114/68 - 114/68)  RR: 17 (2024 09:51) (17 - 17)  SpO2: 98% (2024 09:51) (98% - 98%)    Fetal tracing reassuring  Baseline 140  moderate variability  no accels/decels present    no contractions    Physical exam  Gen: A&O x3, NAD  ABD: soft, non tender, gravid uterus  extrem: no swelling, varicosities, or color changes noted  GYN: no VB, LOF, cervix closed and long  No signs of  labor noted

## 2024-07-11 NOTE — OB PROVIDER TRIAGE NOTE - HISTORY OF PRESENT ILLNESS
29yo  at 31w2d presents complaining of contractions since 830pm last night.  Pt reports she went to [facility] on  for the same complaint and was worked up and sent home with precautions   Pt reports good fetal movement, and denies any vaginal bleeding or loss of fluid.  Denies dysuria, discharge, recent intercourse, or any other concerns    PrenatalHx: denies issues with current pregnancy  OBHx:   C/S x1 for arrest of labor - 2019 full term baby boy 8lb5oz  GYNHx: denies  PMHx: microscopic colitis dx 2023  Meds: omeprazole 40mg QD, PNV  PSHx: C/S   Allerg: NKDA  SocHx: denies cigarette use/alcohol consumption/illicit drug use  PsychHx: denies

## 2024-07-11 NOTE — OB PROVIDER TRIAGE NOTE - ADDITIONAL INSTRUCTIONS
Discharge home with strict precautions  Report back to triage with vaginal bleeding, leakage of fluid, decreased fetal movement, abdominal or pelvic pain, or any other concerns  Kick counts reviewed  Increase oral hydration  Follow up in office as scheduled

## 2024-07-11 NOTE — OB RN TRIAGE NOTE - SPO2 (%)
Encounter Date: 8/17/2022       History     Chief Complaint   Patient presents with    Chest Pain     Pt reports chest pain and headache since last night.     Presents with chest pain today. States took some tylenol for a headache after which he started feeling chest pain.    The history is provided by the patient.   Chest Pain  The current episode started 2 to 3 hours ago. Chest pain occurs constantly. The chest pain is improving. At its most intense, the chest pain is at 5/10. The chest pain is currently at 2/10. The quality of the pain is described as aching and burning. The pain does not radiate. Pertinent negatives for primary symptoms include no fever, no syncope, no shortness of breath, no cough, no palpitations, no abdominal pain, no nausea and no vomiting.   Pertinent negatives for associated symptoms include no diaphoresis, no lower extremity edema, no near-syncope and no weakness. He tried nothing for the symptoms. Risk factors include male gender.   Pertinent negatives for past medical history include no CAD, no diabetes, no DVT, no hyperlipidemia, no hypertension, no MI and no PE.     Review of patient's allergies indicates:  No Known Allergies  No past medical history on file.  No past surgical history on file.  No family history on file.     Review of Systems   Constitutional: Negative for diaphoresis and fever.   HENT: Negative for sore throat.    Respiratory: Negative for cough and shortness of breath.    Cardiovascular: Positive for chest pain. Negative for palpitations, syncope and near-syncope.   Gastrointestinal: Negative for abdominal pain, nausea and vomiting.   Genitourinary: Negative for dysuria.   Musculoskeletal: Negative for back pain.   Skin: Negative for rash.   Neurological: Positive for headaches. Negative for weakness.   Hematological: Does not bruise/bleed easily.   All other systems reviewed and are negative.      Physical Exam     Initial Vitals [08/17/22 1903]   BP Pulse Resp  Temp SpO2   106/71 77 18 99.1 °F (37.3 °C) 98 %      MAP       --         Physical Exam    Nursing note and vitals reviewed.  Constitutional: He appears well-developed.   HENT:   Head: Normocephalic and atraumatic.   Eyes: Conjunctivae are normal. Pupils are equal, round, and reactive to light.   Neck: Neck supple.   Normal range of motion.  Cardiovascular: Regular rhythm, normal heart sounds and intact distal pulses.   Pulmonary/Chest: Breath sounds normal. No respiratory distress.   Abdominal: Abdomen is soft. Bowel sounds are normal. He exhibits no distension. There is no abdominal tenderness. There is no rebound and no guarding.   Musculoskeletal:         General: No edema. Normal range of motion.      Cervical back: Normal range of motion and neck supple.     Neurological: He is alert and oriented to person, place, and time. He has normal strength. GCS score is 15. GCS eye subscore is 4. GCS verbal subscore is 5. GCS motor subscore is 6.   Skin: Skin is warm and dry. No rash noted.   Psychiatric: His behavior is normal.         ED Course   Procedures  Labs Reviewed   CK - Normal   CK-MB - Normal   TROPONIN I - Normal   EXTRA TUBES    Narrative:     The following orders were created for panel order EXTRA TUBES.  Procedure                               Abnormality         Status                     ---------                               -----------         ------                     Light Blue Top Hold[[id number]]                              In process                 Red Top Hold[[id number]]                                     In process                 Lavender Top Hold[[id number]]                                In process                   Please view results for these tests on the individual orders.   LIGHT BLUE TOP HOLD   RED TOP HOLD   LAVENDER TOP HOLD     EKG Readings: (Independently Interpreted)   Initial Reading: No STEMI. Rhythm: Normal Sinus Rhythm. Heart Rate: 86. ST Segments: Normal ST Segments. T  Waves Flipped: III and AVF. Axis: Normal.       Imaging Results          X-Ray Chest PA And Lateral (In process)               X-Rays:   Independently Interpreted Readings:   Chest X-Ray: Normal heart size.  No infiltrates.  No acute abnormalities.     Medications - No data to display                       Clinical Impression:   Final diagnoses:  [R07.9] Chest pain  [R07.89] Atypical chest pain (Primary)          ED Disposition Condition    Discharge Stable        ED Prescriptions     None        Follow-up Information     Follow up With Specialties Details Why Contact Info    [facility] Dept Emergency Medicine  If symptoms worsen [facility] [id number] [phone]    [facility]  In 6 months  [facility] [address] [phone]           Delvis Shoemkaer MD  08/17/22 2150       Delvis Shoemaker MD  08/17/22 2151     100

## 2024-07-12 DIAGNOSIS — O34.219 MATERNAL CARE FOR UNSPECIFIED TYPE SCAR FROM PREVIOUS CESAREAN DELIVERY: ICD-10-CM

## 2024-07-12 DIAGNOSIS — K52.839 MICROSCOPIC COLITIS, UNSPECIFIED: ICD-10-CM

## 2024-07-12 DIAGNOSIS — O47.03 FALSE LABOR BEFORE 37 COMPLETED WEEKS OF GESTATION, THIRD TRIMESTER: ICD-10-CM

## 2024-07-12 DIAGNOSIS — O99.613 DISEASES OF THE DIGESTIVE SYSTEM COMPLICATING PREGNANCY, THIRD TRIMESTER: ICD-10-CM

## 2024-07-12 DIAGNOSIS — Z3A.31 31 WEEKS GESTATION OF PREGNANCY: ICD-10-CM

## 2024-07-12 DIAGNOSIS — Z87.59 PERSONAL HISTORY OF OTHER COMPLICATIONS OF PREGNANCY, CHILDBIRTH AND THE PUERPERIUM: ICD-10-CM

## 2024-07-17 ENCOUNTER — ASOB RESULT (OUTPATIENT)
Age: 29
End: 2024-07-17

## 2024-07-17 ENCOUNTER — APPOINTMENT (OUTPATIENT)
Dept: MATERNAL FETAL MEDICINE | Facility: CLINIC | Age: 29
End: 2024-07-17

## 2024-07-17 PROCEDURE — 99213 OFFICE O/P EST LOW 20 MIN: CPT | Mod: 95

## 2024-07-31 ENCOUNTER — APPOINTMENT (OUTPATIENT)
Dept: ANTEPARTUM | Facility: CLINIC | Age: 29
End: 2024-07-31
Payer: COMMERCIAL

## 2024-07-31 ENCOUNTER — ASOB RESULT (OUTPATIENT)
Age: 29
End: 2024-07-31

## 2024-07-31 PROCEDURE — 76819 FETAL BIOPHYS PROFIL W/O NST: CPT

## 2024-07-31 PROCEDURE — 76816 OB US FOLLOW-UP PER FETUS: CPT

## 2024-09-06 ENCOUNTER — INPATIENT (INPATIENT)
Facility: HOSPITAL | Age: 29
LOS: 1 days | Discharge: HOME | End: 2024-09-08
Attending: STUDENT IN AN ORGANIZED HEALTH CARE EDUCATION/TRAINING PROGRAM | Admitting: STUDENT IN AN ORGANIZED HEALTH CARE EDUCATION/TRAINING PROGRAM
Payer: COMMERCIAL

## 2024-09-06 VITALS
RESPIRATION RATE: 16 BRPM | HEIGHT: 62 IN | TEMPERATURE: 99 F | WEIGHT: 184.97 LBS | HEART RATE: 92 BPM | DIASTOLIC BLOOD PRESSURE: 77 MMHG | OXYGEN SATURATION: 96 % | SYSTOLIC BLOOD PRESSURE: 114 MMHG

## 2024-09-06 DIAGNOSIS — O34.219 MATERNAL CARE FOR UNSPECIFIED TYPE SCAR FROM PREVIOUS CESAREAN DELIVERY: ICD-10-CM

## 2024-09-06 DIAGNOSIS — Z98.891 HISTORY OF UTERINE SCAR FROM PREVIOUS SURGERY: Chronic | ICD-10-CM

## 2024-09-06 DIAGNOSIS — Z34.80 ENCOUNTER FOR SUPERVISION OF OTHER NORMAL PREGNANCY, UNSPECIFIED TRIMESTER: ICD-10-CM

## 2024-09-06 DIAGNOSIS — Z98.890 OTHER SPECIFIED POSTPROCEDURAL STATES: Chronic | ICD-10-CM

## 2024-09-06 DIAGNOSIS — O26.899 OTHER SPECIFIED PREGNANCY RELATED CONDITIONS, UNSPECIFIED TRIMESTER: ICD-10-CM

## 2024-09-06 LAB
ABO RH CONFIRMATION: SIGNIFICANT CHANGE UP
APTT BLD: 29.4 SEC — SIGNIFICANT CHANGE UP (ref 24.5–35.6)
BASOPHILS # BLD AUTO: 0.03 K/UL — SIGNIFICANT CHANGE UP (ref 0–0.2)
BASOPHILS NFR BLD AUTO: 0.3 % — SIGNIFICANT CHANGE UP (ref 0–2)
EOSINOPHIL # BLD AUTO: 0.06 K/UL — SIGNIFICANT CHANGE UP (ref 0–0.5)
EOSINOPHIL NFR BLD AUTO: 0.6 % — SIGNIFICANT CHANGE UP (ref 0–6)
FIBRINOGEN PPP-MCNC: 400 MG/DL — SIGNIFICANT CHANGE UP (ref 200–475)
HCT VFR BLD CALC: 35 % — SIGNIFICANT CHANGE UP (ref 34.5–45)
HGB BLD-MCNC: 11.2 G/DL — LOW (ref 11.5–15.5)
HIV 1 & 2 AB SERPL IA.RAPID: SIGNIFICANT CHANGE UP
IMM GRANULOCYTES NFR BLD AUTO: 0.5 % — SIGNIFICANT CHANGE UP (ref 0–0.9)
INR BLD: 0.86 RATIO — SIGNIFICANT CHANGE UP (ref 0.85–1.18)
LYMPHOCYTES # BLD AUTO: 1.97 K/UL — SIGNIFICANT CHANGE UP (ref 1–3.3)
LYMPHOCYTES # BLD AUTO: 20.2 % — SIGNIFICANT CHANGE UP (ref 13–44)
MCHC RBC-ENTMCNC: 26.7 PG — LOW (ref 27–34)
MCHC RBC-ENTMCNC: 32 GM/DL — SIGNIFICANT CHANGE UP (ref 32–36)
MCV RBC AUTO: 83.5 FL — SIGNIFICANT CHANGE UP (ref 80–100)
MONOCYTES # BLD AUTO: 0.46 K/UL — SIGNIFICANT CHANGE UP (ref 0–0.9)
MONOCYTES NFR BLD AUTO: 4.7 % — SIGNIFICANT CHANGE UP (ref 2–14)
NEUTROPHILS # BLD AUTO: 7.18 K/UL — SIGNIFICANT CHANGE UP (ref 1.8–7.4)
NEUTROPHILS NFR BLD AUTO: 73.7 % — SIGNIFICANT CHANGE UP (ref 43–77)
NRBC # BLD: 0 /100 WBCS — SIGNIFICANT CHANGE UP (ref 0–0)
PLATELET # BLD AUTO: 238 K/UL — SIGNIFICANT CHANGE UP (ref 150–400)
PROTHROM AB SERPL-ACNC: 9.8 SEC — SIGNIFICANT CHANGE UP (ref 9.5–13)
RBC # BLD: 4.19 M/UL — SIGNIFICANT CHANGE UP (ref 3.8–5.2)
RBC # FLD: 14.2 % — SIGNIFICANT CHANGE UP (ref 10.3–14.5)
WBC # BLD: 9.75 K/UL — SIGNIFICANT CHANGE UP (ref 3.8–10.5)
WBC # FLD AUTO: 9.75 K/UL — SIGNIFICANT CHANGE UP (ref 3.8–10.5)

## 2024-09-06 DEVICE — INTERCEED 5 X 6" XL: Type: IMPLANTABLE DEVICE | Status: FUNCTIONAL

## 2024-09-06 RX ORDER — KETOROLAC TROMETHAMINE 30 MG/ML
30 INJECTION, SOLUTION INTRAMUSCULAR EVERY 6 HOURS
Refills: 0 | Status: DISCONTINUED | OUTPATIENT
Start: 2024-09-06 | End: 2024-09-07

## 2024-09-06 RX ORDER — HEPARIN SODIUM,BOVINE 1000/ML
5000 VIAL (ML) INJECTION EVERY 12 HOURS
Refills: 0 | Status: DISCONTINUED | OUTPATIENT
Start: 2024-09-06 | End: 2024-09-08

## 2024-09-06 RX ORDER — CEFAZOLIN SODIUM 2 G/100ML
2000 INJECTION, SOLUTION INTRAVENOUS ONCE
Refills: 0 | Status: COMPLETED | OUTPATIENT
Start: 2024-09-06 | End: 2024-09-06

## 2024-09-06 RX ORDER — IBUPROFEN 600 MG
600 TABLET ORAL EVERY 6 HOURS
Refills: 0 | Status: COMPLETED | OUTPATIENT
Start: 2024-09-07 | End: 2025-08-06

## 2024-09-06 RX ORDER — ACETAMINOPHEN 325 MG/1
975 TABLET ORAL
Refills: 0 | Status: DISCONTINUED | OUTPATIENT
Start: 2024-09-07 | End: 2024-09-08

## 2024-09-06 RX ORDER — OXYTOCIN 10 UNIT/ML
333.33 AMPUL (ML) INJECTION
Qty: 20 | Refills: 0 | Status: DISCONTINUED | OUTPATIENT
Start: 2024-09-06 | End: 2024-09-08

## 2024-09-06 RX ORDER — ONDANSETRON 2 MG/ML
4 INJECTION, SOLUTION INTRAMUSCULAR; INTRAVENOUS EVERY 4 HOURS
Refills: 0 | Status: COMPLETED | OUTPATIENT
Start: 2024-09-06 | End: 2024-09-07

## 2024-09-06 RX ORDER — CHLORHEXIDINE GLUCONATE 40 MG/ML
1 SOLUTION TOPICAL DAILY
Refills: 0 | Status: DISCONTINUED | OUTPATIENT
Start: 2024-09-06 | End: 2024-09-06

## 2024-09-06 RX ORDER — DIPHENHYDRAMINE HCL 50 MG
25 CAPSULE ORAL EVERY 6 HOURS
Refills: 0 | Status: DISCONTINUED | OUTPATIENT
Start: 2024-09-06 | End: 2024-09-08

## 2024-09-06 RX ORDER — FERROUS SULFATE 325(65) MG
325 TABLET ORAL DAILY
Refills: 0 | Status: DISCONTINUED | OUTPATIENT
Start: 2024-09-06 | End: 2024-09-08

## 2024-09-06 RX ORDER — OXYCODONE HYDROCHLORIDE 5 MG/1
5 TABLET ORAL EVERY 4 HOURS
Refills: 0 | Status: DISCONTINUED | OUTPATIENT
Start: 2024-09-07 | End: 2024-09-08

## 2024-09-06 RX ORDER — LANOLIN
1 OINTMENT (GRAM) TOPICAL EVERY 6 HOURS
Refills: 0 | Status: DISCONTINUED | OUTPATIENT
Start: 2024-09-06 | End: 2024-09-08

## 2024-09-06 RX ORDER — FAMOTIDINE 10 MG/ML
20 INJECTION INTRAVENOUS ONCE
Refills: 0 | Status: COMPLETED | OUTPATIENT
Start: 2024-09-06 | End: 2024-09-06

## 2024-09-06 RX ORDER — TETANUS TOXOID, REDUCED DIPHTHERIA TOXOID AND ACELLULAR PERTUSSIS VACCINE, ADSORBED 5; 2.5; 8; 8; 2.5 [IU]/.5ML; [IU]/.5ML; UG/.5ML; UG/.5ML; UG/.5ML
0.5 SUSPENSION INTRAMUSCULAR ONCE
Refills: 0 | Status: DISCONTINUED | OUTPATIENT
Start: 2024-09-06 | End: 2024-09-08

## 2024-09-06 RX ORDER — SODIUM CITRATE AND CITRIC ACID MONOHYDRATE 334; 500 MG/5ML; MG/5ML
30 SOLUTION ORAL ONCE
Refills: 0 | Status: COMPLETED | OUTPATIENT
Start: 2024-09-06 | End: 2024-09-06

## 2024-09-06 RX ORDER — VITAMIN A, ASCORBIC ACID, VITAMIN D, .ALPHA.-TOCOPHEROL, THIAMINE MONONITRATE, RIBOFLAVIN, NIACIN, PYRIDOXINE HYDROCHLORIDE, FOLIC ACID, CYANOCOBALAMIN, CALCIUM, IRON, MAGNESIUM, ZINC, AND COPPER 2700; 70; 400; 30; 1.6; 1.8; 18; 2.5; 1; 12; 100; 65; 25; 25; 2 [IU]/1; MG/1; [IU]/1; [IU]/1; MG/1; MG/1; MG/1; MG/1; MG/1; UG/1; MG/1; MG/1; MG/1; MG/1; MG/1
1 TABLET ORAL DAILY
Refills: 0 | Status: DISCONTINUED | OUTPATIENT
Start: 2024-09-06 | End: 2024-09-08

## 2024-09-06 RX ADMIN — CHLORHEXIDINE GLUCONATE 1 APPLICATION(S): 40 SOLUTION TOPICAL at 16:31

## 2024-09-06 RX ADMIN — Medication 1000 MILLIUNIT(S)/MIN: at 20:39

## 2024-09-06 RX ADMIN — CEFAZOLIN SODIUM 100 MILLIGRAM(S): 2 INJECTION, SOLUTION INTRAVENOUS at 19:00

## 2024-09-06 RX ADMIN — ONDANSETRON 4 MILLIGRAM(S): 2 INJECTION, SOLUTION INTRAMUSCULAR; INTRAVENOUS at 23:40

## 2024-09-06 RX ADMIN — FAMOTIDINE 20 MILLIGRAM(S): 10 INJECTION INTRAVENOUS at 16:20

## 2024-09-06 NOTE — OB PROVIDER H&P - HISTORY OF PRESENT ILLNESS
1.64 Patient is a 28 year old  at 39w3d who presents to triage for scheduled repeat c/s.  Denies vaginal bleeding, leakage of fluid, decreased fetal movement, or any other concerns.  PNC with Dr Boateng  Pmhx: Microscopic colitis  SxHx: c/s x1, endoscopy and colonoscopy  Meds: PNV  Allergies: NKDA  OBHx:   Preg 1- 2019- FT primary c/s for NRFHT  Gynhx: normal menses, one partner, no stds no cysts, normal paps  Socialhx; neg x 3, no anxiety or depression

## 2024-09-06 NOTE — OB RN PATIENT PROFILE - CAREGIVER ADDRESS
Problem: Patient Care Overview  Goal: Plan of Care/Patient Progress Review  OT/CR 6C  Discharge Planner OT   Patient plan for discharge: discussion discharge planning with pt, pt is agreeable to discharge to home with OP CR  Current status: SBA sit<>stand x4 reps throughout session. Pt ambulated ~400ft with FWW, SBA and one standing rest break. Pt ambulated within room with no AD and SBA to completed household management tasks of cleaning up his room and making the bed.   Barriers to return to prior living situation: fatigue, decreased activity tolerance, acute medical needs  Recommendations for discharge: Home with assist and OP CR  Rationale for recommendations: Pt is primarily independent with ADL completion, however would benefit from continued skilled therapy to increase activity tolerance.       Entered by: Serena Richardson 09/22/2018 9:25 AM            [address] [facility] [address]

## 2024-09-06 NOTE — OB PROVIDER DELIVERY SUMMARY - NSPROVIDERDELIVERYNOTE_OBGYN_ALL_OB_FT
Initially, pt desired a TOLAC but decided to proceed with RCS in the setting of no labor symptoms. Hx is pertinent for HIV positive partner. Pt has been tested multiple times and found to be HIV negative. ID and MFM following as well. RCS uncomplicated. Retrograde fill of the bladder since anesthesia was concerned of low urinary output. Bladder was intact with no defects. Urine in grijalva was clear and urinary output increased with IVF hydration. Live male infant delivered in LOT position. Apgar 9/9. EBL 500cc. See dictation note

## 2024-09-06 NOTE — OB RN PATIENT PROFILE - FALL HARM RISK - UNIVERSAL INTERVENTIONS
Bed in lowest position, wheels locked, appropriate side rails in place/Call bell, personal items and telephone in reach/Instruct patient to call for assistance before getting out of bed or chair/Non-slip footwear when patient is out of bed/[place] to call system/Physically safe environment - no spills, clutter or unnecessary equipment/Purposeful Proactive Rounding/Room/bathroom lighting operational, light cord in reach

## 2024-09-06 NOTE — OB PROVIDER H&P - NSLOWPPHRISK_OBGYN_A_OB
Razo Pregnancy/Less than or equal to 4 previous vaginal births/No known bleeding disorder/No history of postpartum hemorrhage/No other PPH risks indicated

## 2024-09-06 NOTE — OB RN DELIVERY SUMMARY - NS_GESTAGEATBIRTHA_OBGYN_ALL_OB_FT
Cardiology Consult/New Patient     Patient ID: Lillie Ward 70 y.o. female. 1950  PCP: Machelle Gallardo MD   History Present Illness     HPI:     Dear Dr Tompkins,    On November 12, 2020 I met Lillie Ward in the office for the first time at your request.  She is a 70-year-old female with severe rheumatoid arthritis and diabetes mellitus.  She is treated for hyperlipidemia and reflux in addition to this.    She is scheduled to have a right hip replacement by Dr. Pereira on November 16.  She has never had any cardiovascular issues.  She denies hypertension.  She does not smoke.  She has no significant family history for premature coronary disease.  There are days that she is quite active when her hip is not terribly painful.  During those days she can walk 2 flights of steps without difficulty.  He also walks her dog 50 minutes at least 1-2 times daily.  She denies chest pain, shortness of breath, palpitations, edema, PND, or orthopnea.  She has never had syncope.    She is retired and a .  She does live with a significant other.  She has 2 grown children who are well.  She does not smoke.  She drinks 5 beers per week.    Her surgeries include bilateral cataracts and a right total knee replacement.  She is also had 2 separate foot surgeries.  She takes her aspirin every 3 days.  She is allergic to Neosporin.    She has no GI symptoms, liver disease, or known kidney disease.  She has no pulmonary symptoms.    The rest of her review of systems is completely negative.        Past History     Past Medical History:   Diagnosis Date   • Acid reflux    • Diabetes mellitus type I (CMS/[facility])     Dr. Cerda - [facility]   • DKA, type 1 (CMS/[facility]) 09/2016    due to insulin pump malfunction - hospitalized   • GERD (gastroesophageal reflux disease)    • H/O colonoscopy 12/2013    [place].  Normal   • Hyperlipidemia    • Hypoglycemia unawareness associated with type 1 diabetes mellitus (CMS/[facility])    •  Macular degeneration     BEGINNING STAGES   • Pulmonary fibrosis (CMS/HCC)     CLEARED BY PULMONOLIGIST 2 YEARS AGO 2018   • Pulmonary nodule, left 07/2017   • Rheumatoid arthritis (CMS/HCC)       RHEUMATIOD   • Skin cancer      Past Surgical History:   Procedure Laterality Date   • COLONOSCOPY     • EYE SURGERY Bilateral     CATARACT   • FOOT SURGERY Left 2009    reconstruction   • FOOT SURGERY Right 2007    reconstruction   • JOINT REPLACEMENT Right     SHOULDER   • REPLACEMENT TOTAL KNEE Left 04/04/2017    [facility]   • SKIN BIOPSY     • TOTAL SHOULDER ARTHROPLASTY Right 1990     Social History     Tobacco Use   • Smoking status: Never Smoker   • Smokeless tobacco: Never Used   Substance Use Topics   • Alcohol use: Yes     Frequency: 2-3 times a week     Drinks per session: 1 or 2   • Drug use: No     Family History   Problem Relation Age of Onset   • Stroke Biological Mother    • Lung cancer Biological Father    • No Known Problems Biological Sister    • Rheum arthritis Other         1 of 8 siblings   • Bipolar disorder Biological Son    • Alcohol abuse Biological Son    • Depression Biological Son      Allergies/Medications   Allergies: Neomycin, Neomycin-bacitracin-polymyxin, Neomycin-bacitracnzn-polymyxnb, Sulfamethoxazole-trimethoprim, and Tetracycline    Current Medications:   Outpatient Encounter Medications as of 11/12/2020:   •  acetaminophen (TYLENOL) 325 mg tablet, Take by mouth every 6 (six) hours as needed for mild pain.  •  aspirin 81 mg enteric coated tablet, Take 81 mg by mouth every other day.  •  atorvastatin (LIPITOR) 10 mg tablet, Take 10 mg by mouth nightly.    •  cholecalciferol, vitamin D3, (VITAMIN D3) 1,000 unit capsule, Take 1,000 Units by mouth daily.    •  docosahexaenoic acid/epa (FISH OIL ORAL), Take by mouth 2 (two) times a day. For dry eyes  •  famotidine (PEPCID) 10 mg tablet, Take 2 tablets (20 mg total) by mouth daily as needed. With ibuprofen  •  folic acid (FOLVITE) 1 mg tablet,  "Take 1 mg by mouth daily.  •  glucagon, human recombinant, 1 mg injection, Infuse 1 mg into a venous catheter once.  •  insulin lispro (HumaLOG U-100 Insulin) 100 unit/mL cartridge, inject by subcutaneous route per prescriber's instructions. Insulin dosing requires individualization.  •  Lactobac no.41-Bifidobact no.7 (PROBIOTIC-10) 70 mg (3 billion cell) capsule, Take by mouth daily.  •  lisinopriL (PRINIVIL) 10 mg tablet, Take 1 tablet by mouth once daily (Patient taking differently: Take 10 mg by mouth daily.  )  •  METHOTREXATE SODIUM INJ, Inject 2.5 mg as directed once a week. Fridays   •  multivit with minerals/lutein (MULTIVITAMIN 50 PLUS ORAL), No SIG Entered  •  riTUXimab (RITUXAN) 10 mg/mL chemo injection, Inject 10 mg/mL as directed. Every 16 weeks   Sept 7,2020   •  turmeric root extract 500 mg capsule, Take 1,000 mg by mouth daily.  •  vit A/vit C/vit E/zinc/copper (PRESERVISION AREDS ORAL), Take by mouth daily.     ROS   The rest of her review of systems is completely negative.    Vitals:    11/12/20 1416 11/12/20 1418   BP: 122/70 122/68   BP Location: Left upper arm Right upper arm   Patient Position: Sitting Sitting   Pulse: 90    Temp: 37.1 °C (98.7 °F)    TempSrc: Oral    SpO2: 96%    Weight: 50.8 kg (112 lb)    Height: 1.549 m (5' 1\")         Physical Exam   She is comfortable.  Skin is warm and dry.  Conjunctiva pink.  Affect is appropriate.  Blood pressure is 122/70.  Heart rate is 80 and regular.  No JVD or HJR.  Chest is clear.  Regular rhythm.  Normal S1 and S2.  I heard no murmurs or gallops.  Abdomen is soft with good bowel sounds.  No organomegaly.  No clubbing, cyanosis, or edema.  Palpable peripheral pulses.  No rash.  Severe deformity of both hands related to her rheumatoid arthritis.  Labs/ Imaging/Procedures     Lab Results   Component Value Date    WBC 6.14 11/11/2020    HGB 15.1 11/11/2020    HCT 43.1 11/11/2020    MCV 92.7 11/11/2020     11/11/2020     Lab Results "   Component Value Date    GLUCOSE 73 11/11/2020    CALCIUM 9.8 11/11/2020     11/11/2020    K 4.3 11/11/2020    CO2 24 11/11/2020     11/11/2020    BUN 8 11/11/2020    CREATININE 0.6 11/11/2020     Lab Results   Component Value Date    HGBA1C 6.9 (H) 11/11/2020           EKG  Today's EKG is within normal limits.  Assessment/Plan     Problem List Items Addressed This Visit     None          Impression:     Despite her hip issues, and is functionally in reasonable shape.  She has no cardiovascular symptoms.  Her EKG is normal.  Her physical examination is normal.  I would place her at low to moderate risk for general anesthesia.    She understands that her aspirin and fish oil should be discontinued prior to her surgery.  I will discharge her from my care unless any new problems should pop up.    Thank you for allowing me to participate in the care of this patient.  I hope this information is helpful.  Please do not hesitate to contact me with any questions or concerns.      Madiha Wise MD FACC, FACP  11/12/2020   39w7h

## 2024-09-06 NOTE — OB RN INTRAOPERATIVE NOTE - NSTRANSFERREDTO1_OBGYN_ALL_OB
L&D PACU disturbance    Altered mental state    Sepsis [facility])        PAST MEDICAL HISTORY:    Past Medical History:   Diagnosis Date    [facility]    d/t Vancomycin, on Dialysis [facility] right chest    Blind in both eyes     Hemodialysis patient ([facility]     Hyperthyroidism     MR (mental retardation)     Osteoarthritis     Pneumonia 01/06/2017       DIET:    DIET TUBE FEED CONTINUOUS/CYCLIC NPO; STANDARD WITH FIBER; Nasogastric; 20; 55; 24     PHYSICAL EXAM:     Patient Vitals for the past 24 hrs:   BP Temp Temp src Pulse Resp SpO2 Weight   03/05/21 1200 -- 97.2 °F (36.2 °C) -- 74 16 95 % --   03/05/21 1100 (!) 113/48 -- -- 76 14 96 % --   03/05/21 1000 (!) 93/49 -- -- 81 10 97 % --   03/05/21 0900 (!) 101/42 -- -- 88 17 92 % --   03/05/21 0800 (!) 101/44 98.1 °F (36.7 °C) Bladder 78 14 97 % --   03/05/21 0700 (!) 124/53 -- -- 65 18 96 % --   03/05/21 0600 (!) 116/50 -- -- 67 20 99 % 135 lb 14.4 oz (61.6 kg)   03/05/21 0500 99/65 -- -- 72 13 96 % --   03/05/21 0400 114/68 98.2 °F (36.8 °C) Bladder 85 24 95 % --   03/05/21 0300 (!) 111/52 -- -- 70 16 95 % --   03/05/21 0200 (!) 93/53 -- -- 73 18 96 % --   03/05/21 0100 (!) 106/52 -- -- 86 20 97 % --   03/05/21 0051 -- -- -- 88 -- -- --   03/05/21 0000 (!) 107/47 99 °F (37.2 °C) Bladder 73 16 94 % --   03/04/21 2300 (!) 101/59 -- -- 95 17 95 % --   03/04/21 2200 -- -- -- 91 19 92 % --   03/04/21 2100 -- -- -- 78 16 95 % --   03/04/21 2000 (!) 102/44 98.6 °F (37 °C) Bladder 84 15 95 % --   03/04/21 1957 -- -- -- 83 -- -- --   03/04/21 1900 (!) 112/39 -- -- 95 14 95 % --   03/04/21 1800 (!) 109/56 98.8 °F (37.1 °C) Bladder 95 13 92 % --   03/04/21 1727 (!) 101/50 -- -- 112 20 92 % --   03/04/21 1723 (!) 126/50 -- -- 112 22 100 % --   03/04/21 1640 (!) 105/58 99.1 °F (37.3 °C) -- 105 18 100 % --   03/04/21 1545 (!) 108/59 -- -- 110 20 -- --   03/04/21 1535 (!) 119/57 99.3 °F (37.4 °C) Bladder 110 18 100 % --   03/04/21 1506 (!) 116/58 99.1 °F (37.3 °C) -- 113 18 100 % --   03/04/21 1447 (!) 114/58 -- -- 104 13 100 % --   03/04/21 1411 (!) 120/56 99.1 °F (37.3 °C) -- 111 16 100 % --   03/04/21 1358 (!) 112/47 99.3 °F (37.4 °C) -- 118 20 100 % --   03/04/21 1350 (!) 114/51 -- -- 121 25 100 % --   03/04/21 1235 (!) 102/39 -- -- 118 24 100 % --   @      Intake/Output Summary (Last 24 hours) at 3/5/2021 1227  Last data filed at 3/5/2021 1100  Gross per 24 hour   Intake 4991.96 ml   Output 940 ml   Net 4051.96 ml         Wt Readings from Last 3 Encounters:   03/05/21 135 lb 14.4 oz (61.6 kg)   02/09/21 146 lb 4.8 oz (66.4 kg)   09/29/20 154 lb (69.9 kg)       Constitutional:  Pt is lethargic, nonverbal  Head: normocephalic, atraumatic  Neck: no JVD  Cardiovascular: regular rate and rhythm, no murmurs, gallops, or rubs  Respiratory:  No rales, rhochi, or wheezes  Gastrointestinal:  Soft, nontender, nondistended, bowel sounds x 4  Ext: no edema  Skin: dry, no rash    MEDS (scheduled):    levothyroxine  150 mcg Oral Daily    insulin lispro  0-6 Units Subcutaneous E9I    folic acid  1 mg Intravenous Daily    thiamine  100 mg Intravenous Daily    hydrocortisone sodium succinate PF  100 mg Intravenous Q8H    famotidine (PEPCID) injection  20 mg Intravenous Daily    sodium chloride flush  10 mL Intravenous 2 times per day    [Held by provider] heparin (porcine)  5,000 Units Subcutaneous 3 times per day    cefepime  1,000 mg Intravenous Q8H       MEDS (infusions):   dextrose      norepinephrine 29 mcg/min (03/05/21 1221)    vasopressin (Septic Shock) infusion Stopped (03/05/21 0650)    dextrose 5 % and 0.45 % NaCl 100 mL/hr at 03/05/21 1024       MEDS (prn):  glucose, dextrose, glucagon (rDNA), dextrose, sodium chloride flush, promethazine **OR** ondansetron, polyethylene glycol, acetaminophen **OR** acetaminophen    DATA:    Recent Labs     03/04/21 2103 03/05/21  0408 03/05/21  0918   WBC 13.8* 12.7* 13.1*   HGB 8.4* 8.0* 7.7*   HCT 29.5* 28.4* 28.4* .0* 104.4* 106.0*   * 74* 62*     Recent Labs     03/04/21  1030 03/04/21  2103 03/05/21  0408   * 156* 151*   K 5.7* 4.1 3.8   * 125* 120*   CO2 22 23 23   BUN 46* 39* 35*   CREATININE 4.1* 2.7* 2.1*   LABGLOM 11 18 23   GLUCOSE 118* 232* 232*   CALCIUM 8.5* 7.8* 7.2*   ALT 9 23 28   AST 38* 88* 89*   BILITOT 0.5 0.6 0.4   ALKPHOS 59 92 77   MG  --  1.6 2.4   PHOS  --  3.5 2.5       Lab Results   Component Value Date    LABALBU 1.5 (L) 03/05/2021    LABALBU 1.7 (L) 03/04/2021    LABALBU 1.4 (L) 03/04/2021     Lab Results   Component Value Date    TSH 12.170 (H) 02/08/2021       Iron Studies  Lab Results   Component Value Date    IRON 15 (L) 03/05/2021    TIBC 83 (L) 03/05/2021    FERRITIN 370 03/05/2021     Vitamin B-12   Date Value Ref Range Status   03/05/2021 1169 (H) 211 - 946 pg/mL Final     Folate   Date Value Ref Range Status   03/05/2021 19.8 4.8 - 24.2 ng/mL Final       Vit D, 25-Hydroxy   Date Value Ref Range Status   12/30/2011 46 30 - 80 ng/mL Final     Comment:     REFERENCE INTERVAL- Vitamin D, 25-Hydroxy    This assay accurately quantifies the sum of vitamin D3,  25-hydroxy and vitamin D2, 25-hydroxy.     0-17 years-  Deficiency- less than 20 ng/mL  Optimum level- greater than or equal to 20 ng/mL*  *(Bonds CL et al. Pediatrics 2008- 122- 1142-52.)    18 years and older-  Deficiency- Less than 20 ng/mL  Insufficiency- 20-29 ng/mL  Optimum Level- 30-80 ng/mL  Possible Toxicity- Greater than 150 ng/mL     PTH   Date Value Ref Range Status   01/26/2017 105 (H) 15 - 65 pg/mL Final       No components found for: URIC    Lab Results   Component Value Date    COLORU DARK YELLOW 03/04/2021    NITRU POSITIVE 03/04/2021    GLUCOSEU 100 03/04/2021    KETUA 15 03/04/2021    UROBILINOGEN 4.0 03/04/2021    BILIRUBINUR MODERATE 03/04/2021       No results found for: Tyler Amaya      IMPRESSION/RECOMMENDATIONS:      1) Acute kidney injury  Most recent baseline serum creatinine 0.7 -> 0.8 in February 2021  Of note patient had a episode of renal failure in 2017 requiring short-term hemodialysis  admitting serum creatinine is 4.1>2.1  Likely prerenal in the setting of severe dehydration and hypotension  CT scan reviewed: No obstructive process  UA noted: Very concentrated urine  Has been started on aggressive IV fluids in the ED  fena < 1  Strict intake and output     2) Hypernatremia  In the setting of severe dehydration  Follow response to aggressive hypotonic fluids     3) Hyperkalemia  In the setting of BEENA  Caution w/ recent constipation   May need treated medically      4) Hypotension  Likely in the setting of sepsis and hypovolemia  Has been started on IV pressors, now off  Blood/urine cultures drawn     5) Anemia  No obvious bleeding  Check fe b12 fol     6) Hx MR  Also blindness     Araceli Rincon.  Denia Angel MD

## 2024-09-06 NOTE — OB RN PATIENT PROFILE - NS_PRENATALLABSOURCEGBS1PN_OBGYN_ALL_OB
Patient calling, reports found a quarter sized lump next to belly button today, feels hard, does not move, does not worsen with straining or coughing, denies any pain, suggested pt closely monitor, scheduled an appt for pt for MD to look at it. Neg covid screen.
unknown

## 2024-09-06 NOTE — OB PROVIDER H&P - NSMATERNALFETALCONCERNS_OBGYN_ALL_OB_FT
Maternal Alert  s/p ID consult as per MFM - see consult note 6/5/2024   Elaina Glodman RN 7/17/2024

## 2024-09-06 NOTE — OB RN PREOPERATIVE CHECKLIST - NS_PREOPPTSENTTO_OBGYN_ALL_OB
"Letter by Delmi Regan NP at      Author: Delmi Regan NP Service: -- Author Type: --    Filed:  Encounter Date: 10/14/2020 Status: (Other)         Patient: Raymond Zheng   MR Number: [id number]   YOB: 1927   Date of Visit: 10/14/2020       [facility]   Telephone Visit      CHIEF COMPLAINT/REASON FOR VISIT:  Chief Complaint   Patient presents with   ? Problem Visit     abnormal labs     Raymond Zheng is a 89 y.o. male who is being evaluated via a billable telephone visit due to the restrictions of the Covid-19 pandemic.     The patient has been notified of the following:     \"This is a telephone visit call between you and your physician/provider. We have found that certain health care needs can be provided without the need for a physical exam.  This service lets us provide the care you need with a short phone conversation.  If a prescription is necessary we can send it to the facility team.  If lab work is needed we can place an order through the facility team to have that test done at a later time.    If during the course of the call the physician/provider feels a telephone visit is not appropriate, you will not be charged for this service.\"     Raymond Zheng complains of    Chief Complaint   Patient presents with   ? Problem Visit     abnormal labs       HPI:   Raymond is a 89 y.o. male who is a resident of [facility].     Today Raymond was noted by nursing to have abnormal labs. His tsh was 6.4 today. It was last checked on 9/16 and was high at that time at 5.45. Per on call provider did not order any levothyroxine or new orders at the time. Repeat level was ordered by me when viewed labs during rounds. Per nursing they have not noted any increase in fatigue, shortness of breath, worsening edema recently. He did throw his gaurav stockings away (even the 2nd pair that was ordered for him) so he does not wear these. Nursing administers meds but does not assist with " other services. He has not complained of anything to nursing lately. Raymond is called on his apartment phone today [phone]. He says he is not having any fatigue, difficulty sleeping, or trouble with bowels. He does not feel less hungry or having any other symptoms of low thyroid function. He thinks his legs are fine and has no pain. He says he is doing fine. He is pleasantly confused and hard of hearing.     I have reviewed and updated the patient's Past Medical History, Social History, Family History and Medication List.    ALLERGIES  Ciprofloxacin, Erythromycin base, Oxycodone-acetaminophen, and Sulfa (sulfonamide antibiotics)    Review of Systems   ROS negative other than what discussed in HPI    There were no vitals filed for this visit.      Labs:  Discussed tsh levels today    Additional provider notes: Discussed with  nursing    Assessment/Plan:    ICD-10-CM    1. Chronic diastolic congestive heart failure (H)  I50.32    2. Other specified hypothyroidism  E03.8    3. Atrial fibrillation, unspecified type (H)  I48.91    4. Essential hypertension  I10    5. Vascular dementia without behavioral disturbance (H)  F01.50       Reviewed meds and no identified cause of new onset hypothyroid, continue current regimen  Start Levothyroxine 25 mcg po daily dx hypothyroid  Repeat Tsh in 6 weeks  Continue encouraging elevation  Of legs, wearing gaurav hose, ambulation for management of edema  Continue monitoring weights and notify  For weight gain >5lbs in 7 days  Hypothyroid may be contributing to A fib, but rates have been controlled    Phone call duration:  15 minutes with additional 25 min spent on counseling and coordination of care. Facility EMR records reviewed. Counseling and coordination with  Nursing regarding lab results, management of hypothyroid and chf.     Delmi Regan NP                   operating room

## 2024-09-06 NOTE — OB RN PATIENT PROFILE - PRO MENTAL HEALTH SX RECENT
S/w Marv. Pt already had his labs done today. Labs result is in your inbox......Faith CUMMINGS   none

## 2024-09-06 NOTE — OB RN DELIVERY SUMMARY - NS_SEPSISRSKCALC_OBGYN_ALL_OB_FT
EOS calculated successfully. EOS Risk Factor: 0.5/1000 live births ([facility] national incidence); GA=39w3d; Temp=98.6; ROM=0.033; GBS='Positive'; Antibiotics='No antibiotics or any antibiotics < 2 hrs prior to birth'

## 2024-09-06 NOTE — OB NEONATOLOGY/PEDIATRICIAN DELIVERY SUMMARY - NSPEDSNEONOTESA_OBGYN_ALL_OB_FT
Requested by OB to attend this repeat  at 39.3  weeks.  Mother is a 30 year old, , blood type O+ .  Prenatal labs as follow: HIV neg, RPR non-reactive, rubella immune, HBsA neg, GBS positive. No significant maternal history. No significant prenatal history.  This pregnancy was uncomplicated.  ROM at delivery with clear fluid . Nuchal cord x1.  Infant emerged vertex, vigorous, with good tone. Delayed cord clamping X 60 secs, then brought to warmer. Dried, suctioned and stimulated.  Apgars  9 and 9. Mom wishes to breast/bottle feed. Consents to Hep B vaccine. Consents to circumcision. Infant admitted to NBN for routine care. Parents updated.

## 2024-09-06 NOTE — OB PROVIDER H&P - PROBLEM SELECTOR PLAN 1
Admit and consent  IV fluids and initial bloodwork  Continuous monitoring fht, toco, vitals  Anesthesia aware  PRBC x 2

## 2024-09-06 NOTE — OB NEONATOLOGY/PEDIATRICIAN DELIVERY SUMMARY - NSMATERNALFETALCONCERNS_OBGYN_ALL_OB_FT
Maternal Alert  s/p ID consult as per MFM - see consult note 6/5/2024   Elaina Goldman RN 7/17/2024

## 2024-09-06 NOTE — OB PROVIDER H&P - BLOOD TRANSFUSION, PREVIOUS, PROFILE
Continue oxcarbazepine 300 mg twice daily.     Decrease rizatriptan 10 mg to 1/2 tab as needed for migraine.    Call with any questions or concerns.    
no

## 2024-09-07 ENCOUNTER — TRANSCRIPTION ENCOUNTER (OUTPATIENT)
Age: 29
End: 2024-09-07

## 2024-09-07 LAB
BASOPHILS # BLD AUTO: 0.03 K/UL — SIGNIFICANT CHANGE UP (ref 0–0.2)
BASOPHILS NFR BLD AUTO: 0.3 % — SIGNIFICANT CHANGE UP (ref 0–2)
EOSINOPHIL # BLD AUTO: 0.02 K/UL — SIGNIFICANT CHANGE UP (ref 0–0.5)
EOSINOPHIL NFR BLD AUTO: 0.2 % — SIGNIFICANT CHANGE UP (ref 0–6)
HCT VFR BLD CALC: 30.3 % — LOW (ref 34.5–45)
HGB BLD-MCNC: 9.9 G/DL — LOW (ref 11.5–15.5)
HIV 1+2 AB+HIV1 P24 AG SERPL QL IA: SIGNIFICANT CHANGE UP
IMM GRANULOCYTES NFR BLD AUTO: 0.4 % — SIGNIFICANT CHANGE UP (ref 0–0.9)
LYMPHOCYTES # BLD AUTO: 1.92 K/UL — SIGNIFICANT CHANGE UP (ref 1–3.3)
LYMPHOCYTES # BLD AUTO: 16.9 % — SIGNIFICANT CHANGE UP (ref 13–44)
MCHC RBC-ENTMCNC: 27 PG — SIGNIFICANT CHANGE UP (ref 27–34)
MCHC RBC-ENTMCNC: 32.7 GM/DL — SIGNIFICANT CHANGE UP (ref 32–36)
MCV RBC AUTO: 82.8 FL — SIGNIFICANT CHANGE UP (ref 80–100)
MONOCYTES # BLD AUTO: 0.65 K/UL — SIGNIFICANT CHANGE UP (ref 0–0.9)
MONOCYTES NFR BLD AUTO: 5.7 % — SIGNIFICANT CHANGE UP (ref 2–14)
NEUTROPHILS # BLD AUTO: 8.7 K/UL — HIGH (ref 1.8–7.4)
NEUTROPHILS NFR BLD AUTO: 76.5 % — SIGNIFICANT CHANGE UP (ref 43–77)
NRBC # BLD: 0 /100 WBCS — SIGNIFICANT CHANGE UP (ref 0–0)
PLATELET # BLD AUTO: 194 K/UL — SIGNIFICANT CHANGE UP (ref 150–400)
RBC # BLD: 3.66 M/UL — LOW (ref 3.8–5.2)
RBC # FLD: 14 % — SIGNIFICANT CHANGE UP (ref 10.3–14.5)
T PALLIDUM AB TITR SER: NEGATIVE — SIGNIFICANT CHANGE UP
WBC # BLD: 11.37 K/UL — HIGH (ref 3.8–10.5)
WBC # FLD AUTO: 11.37 K/UL — HIGH (ref 3.8–10.5)

## 2024-09-07 RX ORDER — IBUPROFEN 600 MG
600 TABLET ORAL EVERY 6 HOURS
Refills: 0 | Status: DISCONTINUED | OUTPATIENT
Start: 2024-09-07 | End: 2024-09-08

## 2024-09-07 RX ORDER — DIPHENHYDRAMINE HCL 50 MG
25 CAPSULE ORAL EVERY 4 HOURS
Refills: 0 | Status: DISCONTINUED | OUTPATIENT
Start: 2024-09-07 | End: 2024-09-08

## 2024-09-07 RX ADMIN — KETOROLAC TROMETHAMINE 30 MILLIGRAM(S): 30 INJECTION, SOLUTION INTRAMUSCULAR at 02:20

## 2024-09-07 RX ADMIN — KETOROLAC TROMETHAMINE 30 MILLIGRAM(S): 30 INJECTION, SOLUTION INTRAMUSCULAR at 13:22

## 2024-09-07 RX ADMIN — ACETAMINOPHEN 975 MILLIGRAM(S): 325 TABLET ORAL at 10:05

## 2024-09-07 RX ADMIN — VITAMIN A, ASCORBIC ACID, VITAMIN D, .ALPHA.-TOCOPHEROL, THIAMINE MONONITRATE, RIBOFLAVIN, NIACIN, PYRIDOXINE HYDROCHLORIDE, FOLIC ACID, CYANOCOBALAMIN, CALCIUM, IRON, MAGNESIUM, ZINC, AND COPPER 1 TABLET(S): 2700; 70; 400; 30; 1.6; 1.8; 18; 2.5; 1; 12; 100; 65; 25; 25; 2 TABLET ORAL at 12:22

## 2024-09-07 RX ADMIN — KETOROLAC TROMETHAMINE 30 MILLIGRAM(S): 30 INJECTION, SOLUTION INTRAMUSCULAR at 06:30

## 2024-09-07 RX ADMIN — ACETAMINOPHEN 975 MILLIGRAM(S): 325 TABLET ORAL at 21:32

## 2024-09-07 RX ADMIN — Medication 5000 UNIT(S): at 09:05

## 2024-09-07 RX ADMIN — KETOROLAC TROMETHAMINE 30 MILLIGRAM(S): 30 INJECTION, SOLUTION INTRAMUSCULAR at 01:23

## 2024-09-07 RX ADMIN — KETOROLAC TROMETHAMINE 30 MILLIGRAM(S): 30 INJECTION, SOLUTION INTRAMUSCULAR at 05:34

## 2024-09-07 RX ADMIN — Medication 5000 UNIT(S): at 21:36

## 2024-09-07 RX ADMIN — KETOROLAC TROMETHAMINE 30 MILLIGRAM(S): 30 INJECTION, SOLUTION INTRAMUSCULAR at 12:22

## 2024-09-07 RX ADMIN — Medication 25 MILLIGRAM(S): at 14:59

## 2024-09-07 RX ADMIN — ACETAMINOPHEN 975 MILLIGRAM(S): 325 TABLET ORAL at 22:30

## 2024-09-07 RX ADMIN — ACETAMINOPHEN 975 MILLIGRAM(S): 325 TABLET ORAL at 14:59

## 2024-09-07 RX ADMIN — KETOROLAC TROMETHAMINE 30 MILLIGRAM(S): 30 INJECTION, SOLUTION INTRAMUSCULAR at 18:06

## 2024-09-07 RX ADMIN — KETOROLAC TROMETHAMINE 30 MILLIGRAM(S): 30 INJECTION, SOLUTION INTRAMUSCULAR at 19:06

## 2024-09-07 RX ADMIN — ACETAMINOPHEN 975 MILLIGRAM(S): 325 TABLET ORAL at 15:59

## 2024-09-07 RX ADMIN — Medication 325 MILLIGRAM(S): at 12:22

## 2024-09-07 RX ADMIN — ACETAMINOPHEN 975 MILLIGRAM(S): 325 TABLET ORAL at 09:05

## 2024-09-07 RX ADMIN — Medication 125 MILLILITER(S): at 01:29

## 2024-09-07 NOTE — DISCHARGE NOTE OB - MEDICATION SUMMARY - MEDICATIONS TO CHANGE
Alexandra Diaz RD, MS, CDN also available on TEAMS 
I will SWITCH the dose or number of times a day I take the medications listed below when I get home from the hospital:  None

## 2024-09-07 NOTE — DISCHARGE NOTE OB - MEDICATION SUMMARY - MEDICATIONS TO TAKE
I will START or STAY ON the medications listed below when I get home from the hospital:    ibuprofen 600 mg oral tablet  -- 1 tab(s) by mouth every 6 hours as needed for  moderate pain  -- Indication: For moderate pain    Tylenol Extra Strength 500 mg oral tablet  -- 2 tab(s) by mouth every 6 hours  -- Indication: For mild pain    Prenatal Plus oral tablet  -- 1 tab(s) by mouth once a day  -- Indication: For breastfeeding    Colace 2-in-1 50 mg-8.6 mg oral tablet  -- 2 tab(s) by mouth once a day (at bedtime) as needed for  constipation  -- Indication: For constipation    simethicone 80 mg oral tablet, chewable  -- 1 tab(s) chewed every 6 hours as needed for  heartburn  -- Indication: For gas pain

## 2024-09-07 NOTE — PROGRESS NOTE ADULT - SUBJECTIVE AND OBJECTIVE BOX
Patient seen at bedside resting comfortably offers no new complaints. + Ambulation to be done, awaiting void no flatus; tolerating regular diet. both breast and bottle feeding. Denies HA, CP, SOB, N/V/D,  no bm; dizziness, palpitations, worsening abdominal pain, worsening vaginal bleeding, or any other concerns.     Vital Signs Last 24 Hrs  T(C): 36.7 (07 Sep 2024 04:20), Max: 37 (06 Sep 2024 14:33)  T(F): 98.1 (07 Sep 2024 04:20), Max: 98.6 (06 Sep 2024 14:33)  HR: 98 (07 Sep 2024 07:30) (80 - 103)  BP: 104/67 (07 Sep 2024 04:20) (104/67 - 130/67)  BP(mean): 79 (07 Sep 2024 04:20) (78 - 86)  RR: 16 (07 Sep 2024 04:20) (13 - 17)  SpO2: 100% (07 Sep 2024 07:30) (96% - 100%)    Parameters below as of 07 Sep 2024 04:20  Patient On (Oxygen Delivery Method): room air        Gen: A&O x 3, NAD  Chest: CTA B/L  Cardiac: S1,S2  RRR  Breast: Soft, nontender, nonengorged  Abdomen: +BS; soft; Nontender, nondistended; dressing removed incision C/D/I  Gyn: minimal lochia   Extremities: Nontender, no worsening edema;                           9.9    11.37 )-----------( 194      ( 07 Sep 2024 06:32 )             30.3

## 2024-09-07 NOTE — DISCHARGE NOTE OB - PATIENT PORTAL LINK FT
You can access the FollowMyHealth Patient Portal offered by [facility] by registering at the following website: [email or web address]. By joining [facility]’s FollowMyHealth portal, you will also be able to view your health information using other applications (apps) compatible with our system.

## 2024-09-07 NOTE — DISCHARGE NOTE OB - HOSPITAL COURSE
Pt admitted for scheduled repeat c-ection  pt had routine post op care and met all expected milestones  pt discharged to home.

## 2024-09-07 NOTE — DISCHARGE NOTE OB - PLAN OF CARE
no sex nothing in vagina no heavy lifting no pushing no straining no strenuous activities  pain medication as needed; stool softener; dulcolax as needed if constipated  walk for exercise: helps recovery   continue prenatal vitamins daily especially whole course of breastfeeding  see your OB in the office for follow up post partum check call the office set up appointment in 1-2weeks  clean wound daily with soap and water; please note wound for redness or swelling; if noted go to the office right away so the doctor can evaluate the wound for possible wound infection  Take ibuprofen 600mg one tablet every 6hours, alternate with Tylenol 500mg one- two tabs every 6 hours as needed for pain. Take iron, folic acid, vitamin C, and prenatal vitamins. Eat iron rich and fortified foods

## 2024-09-07 NOTE — DISCHARGE NOTE OB - CARE PROVIDER_API CALL
Pauline Boateng  Obstetrics and Gynecology  [address] [place] [address]  Phone: [phone]  Fax: [phone]  Established Patient  Follow Up Time: 2 weeks

## 2024-09-07 NOTE — PROGRESS NOTE ADULT - ASSESSMENT
Emergency Department  [address] [facility] [address]  Phone:  [phone]  Fax:  [phone]                                    Gabe Smith   MRN: [id number]    Department:   Emergency Department   Date of Visit:  1/20/2020           After Visit Summary Signature Page    I have received my discharge instructions, and my questions have been answered. I have discussed any challenges I see with this plan with the nurse or doctor.    ..........................................................................................................................................  Patient/Patient Representative Signature      ..........................................................................................................................................  Patient Representative Print Name and Relationship to Patient    ..................................................               ................................................  Date                                   Time    ..........................................................................................................................................  Reviewed by Signature/Title    ...................................................              ..............................................  Date                                               Time          22EPIC Rev 08/18        A/P: POD #1 s/p c/section  anemia of expected blood loss in a      --Pain management as needed  -Advance as per protocol  -OOB and ambulate  -Rpt CBC in am  -rudi d/c'd f/u void  -incentive spirometer  -diet regular  -Encourage breastfeeding   -d/w Tasneem

## 2024-09-07 NOTE — DISCHARGE NOTE OB - CARE PLAN
1 Principal Discharge DX:	Status post repeat low transverse  section  Assessment and plan of treatment:	no sex nothing in vagina no heavy lifting no pushing no straining no strenuous activities  pain medication as needed; stool softener; dulcolax as needed if constipated  walk for exercise: helps recovery   continue prenatal vitamins daily especially whole course of breastfeeding  see your OB in the office for follow up post partum check call the office set up appointment in 1-2weeks  clean wound daily with soap and water; please note wound for redness or swelling; if noted go to the office right away so the doctor can evaluate the wound for possible wound infection  Take ibuprofen 600mg one tablet every 6hours, alternate with Tylenol 500mg one- two tabs every 6 hours as needed for pain.   Principal Discharge DX:	Status post repeat low transverse  section  Assessment and plan of treatment:	no sex nothing in vagina no heavy lifting no pushing no straining no strenuous activities  pain medication as needed; stool softener; dulcolax as needed if constipated  walk for exercise: helps recovery   continue prenatal vitamins daily especially whole course of breastfeeding  see your OB in the office for follow up post partum check call the office set up appointment in 1-2weeks  clean wound daily with soap and water; please note wound for redness or swelling; if noted go to the office right away so the doctor can evaluate the wound for possible wound infection  Take ibuprofen 600mg one tablet every 6hours, alternate with Tylenol 500mg one- two tabs every 6 hours as needed for pain.  Secondary Diagnosis:	Anemia due to acute blood loss  Assessment and plan of treatment:	Take iron, folic acid, vitamin C, and prenatal vitamins. Eat iron rich and fortified foods

## 2024-09-08 VITALS
RESPIRATION RATE: 18 BRPM | HEART RATE: 76 BPM | SYSTOLIC BLOOD PRESSURE: 111 MMHG | TEMPERATURE: 99 F | DIASTOLIC BLOOD PRESSURE: 71 MMHG | OXYGEN SATURATION: 99 %

## 2024-09-08 LAB
BASOPHILS # BLD AUTO: 0.03 K/UL — SIGNIFICANT CHANGE UP (ref 0–0.2)
BASOPHILS # BLD AUTO: 0.04 K/UL — SIGNIFICANT CHANGE UP (ref 0–0.2)
BASOPHILS NFR BLD AUTO: 0.3 % — SIGNIFICANT CHANGE UP (ref 0–2)
BASOPHILS NFR BLD AUTO: 0.3 % — SIGNIFICANT CHANGE UP (ref 0–2)
EOSINOPHIL # BLD AUTO: 0.09 K/UL — SIGNIFICANT CHANGE UP (ref 0–0.5)
EOSINOPHIL # BLD AUTO: 0.12 K/UL — SIGNIFICANT CHANGE UP (ref 0–0.5)
EOSINOPHIL NFR BLD AUTO: 0.9 % — SIGNIFICANT CHANGE UP (ref 0–6)
EOSINOPHIL NFR BLD AUTO: 1 % — SIGNIFICANT CHANGE UP (ref 0–6)
HCT VFR BLD CALC: 25.2 % — LOW (ref 34.5–45)
HCT VFR BLD CALC: 29.5 % — LOW (ref 34.5–45)
HGB BLD-MCNC: 7.9 G/DL — LOW (ref 11.5–15.5)
HGB BLD-MCNC: 9.3 G/DL — LOW (ref 11.5–15.5)
IMM GRANULOCYTES NFR BLD AUTO: 0.5 % — SIGNIFICANT CHANGE UP (ref 0–0.9)
IMM GRANULOCYTES NFR BLD AUTO: 0.5 % — SIGNIFICANT CHANGE UP (ref 0–0.9)
LYMPHOCYTES # BLD AUTO: 18.1 % — SIGNIFICANT CHANGE UP (ref 13–44)
LYMPHOCYTES # BLD AUTO: 2.12 K/UL — SIGNIFICANT CHANGE UP (ref 1–3.3)
LYMPHOCYTES # BLD AUTO: 2.78 K/UL — SIGNIFICANT CHANGE UP (ref 1–3.3)
LYMPHOCYTES # BLD AUTO: 28.6 % — SIGNIFICANT CHANGE UP (ref 13–44)
MCHC RBC-ENTMCNC: 26.8 PG — LOW (ref 27–34)
MCHC RBC-ENTMCNC: 27 PG — SIGNIFICANT CHANGE UP (ref 27–34)
MCHC RBC-ENTMCNC: 31.3 GM/DL — LOW (ref 32–36)
MCHC RBC-ENTMCNC: 31.5 GM/DL — LOW (ref 32–36)
MCV RBC AUTO: 85.4 FL — SIGNIFICANT CHANGE UP (ref 80–100)
MCV RBC AUTO: 85.5 FL — SIGNIFICANT CHANGE UP (ref 80–100)
MONOCYTES # BLD AUTO: 0.6 K/UL — SIGNIFICANT CHANGE UP (ref 0–0.9)
MONOCYTES # BLD AUTO: 0.75 K/UL — SIGNIFICANT CHANGE UP (ref 0–0.9)
MONOCYTES NFR BLD AUTO: 6.2 % — SIGNIFICANT CHANGE UP (ref 2–14)
MONOCYTES NFR BLD AUTO: 6.4 % — SIGNIFICANT CHANGE UP (ref 2–14)
NEUTROPHILS # BLD AUTO: 6.18 K/UL — SIGNIFICANT CHANGE UP (ref 1.8–7.4)
NEUTROPHILS # BLD AUTO: 8.6 K/UL — HIGH (ref 1.8–7.4)
NEUTROPHILS NFR BLD AUTO: 63.5 % — SIGNIFICANT CHANGE UP (ref 43–77)
NEUTROPHILS NFR BLD AUTO: 73.7 % — SIGNIFICANT CHANGE UP (ref 43–77)
NRBC # BLD: 0 /100 WBCS — SIGNIFICANT CHANGE UP (ref 0–0)
NRBC # BLD: 0 /100 WBCS — SIGNIFICANT CHANGE UP (ref 0–0)
PLATELET # BLD AUTO: 174 K/UL — SIGNIFICANT CHANGE UP (ref 150–400)
PLATELET # BLD AUTO: 211 K/UL — SIGNIFICANT CHANGE UP (ref 150–400)
RBC # BLD: 2.95 M/UL — LOW (ref 3.8–5.2)
RBC # BLD: 3.45 M/UL — LOW (ref 3.8–5.2)
RBC # FLD: 14.6 % — HIGH (ref 10.3–14.5)
RBC # FLD: 14.6 % — HIGH (ref 10.3–14.5)
WBC # BLD: 11.69 K/UL — HIGH (ref 3.8–10.5)
WBC # BLD: 9.73 K/UL — SIGNIFICANT CHANGE UP (ref 3.8–10.5)
WBC # FLD AUTO: 11.69 K/UL — HIGH (ref 3.8–10.5)
WBC # FLD AUTO: 9.73 K/UL — SIGNIFICANT CHANGE UP (ref 3.8–10.5)

## 2024-09-08 PROCEDURE — 86901 BLOOD TYPING SEROLOGIC RH(D): CPT

## 2024-09-08 PROCEDURE — 86780 TREPONEMA PALLIDUM: CPT

## 2024-09-08 PROCEDURE — 85610 PROTHROMBIN TIME: CPT

## 2024-09-08 PROCEDURE — 85384 FIBRINOGEN ACTIVITY: CPT

## 2024-09-08 PROCEDURE — 86923 COMPATIBILITY TEST ELECTRIC: CPT

## 2024-09-08 PROCEDURE — 85730 THROMBOPLASTIN TIME PARTIAL: CPT

## 2024-09-08 PROCEDURE — 59025 FETAL NON-STRESS TEST: CPT

## 2024-09-08 PROCEDURE — C1765: CPT

## 2024-09-08 PROCEDURE — 85025 COMPLETE CBC W/AUTO DIFF WBC: CPT

## 2024-09-08 PROCEDURE — 86850 RBC ANTIBODY SCREEN: CPT

## 2024-09-08 PROCEDURE — 86703 HIV-1/HIV-2 1 RESULT ANTBDY: CPT

## 2024-09-08 PROCEDURE — 36415 COLL VENOUS BLD VENIPUNCTURE: CPT

## 2024-09-08 PROCEDURE — 86900 BLOOD TYPING SEROLOGIC ABO: CPT

## 2024-09-08 PROCEDURE — 59050 FETAL MONITOR W/REPORT: CPT

## 2024-09-08 PROCEDURE — 87389 HIV-1 AG W/HIV-1&-2 AB AG IA: CPT

## 2024-09-08 RX ORDER — VITAMIN A, ASCORBIC ACID, VITAMIN D, .ALPHA.-TOCOPHEROL, THIAMINE MONONITRATE, RIBOFLAVIN, NIACIN, PYRIDOXINE HYDROCHLORIDE, FOLIC ACID, CYANOCOBALAMIN, CALCIUM, IRON, MAGNESIUM, ZINC, AND COPPER 2700; 70; 400; 30; 1.6; 1.8; 18; 2.5; 1; 12; 100; 65; 25; 25; 2 [IU]/1; MG/1; [IU]/1; [IU]/1; MG/1; MG/1; MG/1; MG/1; MG/1; UG/1; MG/1; MG/1; MG/1; MG/1; MG/1
1 TABLET ORAL
Qty: 30 | Refills: 0
Start: 2024-09-08 | End: 2024-10-07

## 2024-09-08 RX ORDER — IBUPROFEN 600 MG
1 TABLET ORAL
Qty: 56 | Refills: 0
Start: 2024-09-08 | End: 2024-09-21

## 2024-09-08 RX ORDER — ACETAMINOPHEN 325 MG/1
2 TABLET ORAL
Qty: 112 | Refills: 0
Start: 2024-09-08 | End: 2024-09-21

## 2024-09-08 RX ADMIN — ACETAMINOPHEN 975 MILLIGRAM(S): 325 TABLET ORAL at 12:00

## 2024-09-08 RX ADMIN — ACETAMINOPHEN 975 MILLIGRAM(S): 325 TABLET ORAL at 04:04

## 2024-09-08 RX ADMIN — ACETAMINOPHEN 975 MILLIGRAM(S): 325 TABLET ORAL at 02:51

## 2024-09-08 RX ADMIN — ACETAMINOPHEN 975 MILLIGRAM(S): 325 TABLET ORAL at 13:00

## 2024-09-08 RX ADMIN — VITAMIN A, ASCORBIC ACID, VITAMIN D, .ALPHA.-TOCOPHEROL, THIAMINE MONONITRATE, RIBOFLAVIN, NIACIN, PYRIDOXINE HYDROCHLORIDE, FOLIC ACID, CYANOCOBALAMIN, CALCIUM, IRON, MAGNESIUM, ZINC, AND COPPER 1 TABLET(S): 2700; 70; 400; 30; 1.6; 1.8; 18; 2.5; 1; 12; 100; 65; 25; 25; 2 TABLET ORAL at 11:48

## 2024-09-08 RX ADMIN — Medication 325 MILLIGRAM(S): at 11:47

## 2024-09-08 RX ADMIN — Medication 80 MILLIGRAM(S): at 11:48

## 2024-09-08 RX ADMIN — Medication 5000 UNIT(S): at 11:47

## 2024-09-08 NOTE — PROGRESS NOTE ADULT - SUBJECTIVE AND OBJECTIVE BOX
Patient seen at bedside resting comfortably offers no new complaints. + Ambulation, + void without difficulty, + flatus;  no bm; tolerating regular diet. Pt both breastfeeding and bottle feeding. Pt denies weakness, headache, blurry vision or epigastric pain, chest pain, shortness of breath, N/V/D,  dizziness, palpitations, worsening vaginal bleeding.    Vital Signs Last 24 Hrs  T(C): 36.7 (08 Sep 2024 06:32), Max: 36.9 (07 Sep 2024 18:36)  T(F): 98 (08 Sep 2024 06:32), Max: 98.4 (07 Sep 2024 18:36)  HR: 79 (08 Sep 2024 06:32) (79 - 95)  BP: 109/66 (08 Sep 2024 06:32) (101/64 - 111/76)  BP(mean): --  RR: 17 (08 Sep 2024 06:32) (17 - 18)  SpO2: 99% (08 Sep 2024 06:32) (98% - 99%)    Parameters below as of 08 Sep 2024 06:32  Patient On (Oxygen Delivery Method): room air        Gen: A&O x 3, NAD  Abdomen: +BS; soft; Nontender, nondistended, Incision C/D/I steri strips in place, fundus is firm and below the umbilicus    Gyn: Minimal lochia  Extremities: Nontender, DTRS 2+, no worsening edema                          7.9    9.73  )-----------( 174      ( 08 Sep 2024 06:06 )             25.2

## 2024-09-08 NOTE — PROGRESS NOTE ADULT - ASSESSMENT
A/P: 29 yo POD #2 s/p c/s @39w3d, asymptomatic ABLA, pt stable    -Pain management as needed  -cont post op care  -VTE prophylaxis: heparin, OOB and ambulate  -encourage insentive spirometer use  -Encourage breastfeeding   - Labs and vitals reviewed   - Pt requesting discharge today   - CBC to be repeated @ 2pm before considering discharge today      -d/w Dr. Boateng  A/P: 27 yo POD #2 s/p c/s @39w3d, asymptomatic ABLA, pt stable    -Pain management as needed  -cont post op care  -VTE prophylaxis: heparin, OOB and ambulate  -encourage insentive spirometer use  -Encourage breastfeeding   - Labs and vitals reviewed   - Pt requesting discharge today   - CBC to be repeated @ 2pm before considering discharge today      -d/w Dr. Boateng       agree with above findings. 27yo P2 s/p RCS. POD2. Doing well and meeting milestones.  Cont PPD care  Encourage ambulation, voiding, breastfeeding and ISS use  Defer contraception for PPD visit  Chronic anemia secondary to iron deficiency anemia and pregnancy. EBL 500cc. H/H. stable. VSS. Cont iron supplements.   Anticipate discharge today with f/u in 1 week for postop check   Pauline Boateng MD MPH

## 2024-09-11 ENCOUNTER — EMERGENCY (EMERGENCY)
Facility: HOSPITAL | Age: 29
LOS: 1 days | Discharge: HOME | End: 2024-09-11
Attending: STUDENT IN AN ORGANIZED HEALTH CARE EDUCATION/TRAINING PROGRAM
Payer: COMMERCIAL

## 2024-09-11 VITALS
HEART RATE: 86 BPM | SYSTOLIC BLOOD PRESSURE: 123 MMHG | TEMPERATURE: 98 F | OXYGEN SATURATION: 98 % | RESPIRATION RATE: 16 BRPM | HEIGHT: 62 IN | WEIGHT: 177.03 LBS | DIASTOLIC BLOOD PRESSURE: 82 MMHG

## 2024-09-11 DIAGNOSIS — Z98.890 OTHER SPECIFIED POSTPROCEDURAL STATES: Chronic | ICD-10-CM

## 2024-09-11 DIAGNOSIS — Z98.891 HISTORY OF UTERINE SCAR FROM PREVIOUS SURGERY: Chronic | ICD-10-CM

## 2024-09-11 LAB
ALBUMIN SERPL ELPH-MCNC: 2.7 G/DL — LOW (ref 3.5–5)
ALP SERPL-CCNC: 142 U/L — HIGH (ref 40–120)
ALT FLD-CCNC: 55 U/L DA — SIGNIFICANT CHANGE UP (ref 10–60)
ANION GAP SERPL CALC-SCNC: 4 MMOL/L — LOW (ref 5–17)
AST SERPL-CCNC: 46 U/L — HIGH (ref 10–40)
BASOPHILS # BLD AUTO: 0.04 K/UL — SIGNIFICANT CHANGE UP (ref 0–0.2)
BASOPHILS NFR BLD AUTO: 0.5 % — SIGNIFICANT CHANGE UP (ref 0–2)
BILIRUB SERPL-MCNC: 0.2 MG/DL — SIGNIFICANT CHANGE UP (ref 0.2–1.2)
BUN SERPL-MCNC: 11 MG/DL — SIGNIFICANT CHANGE UP (ref 7–18)
CALCIUM SERPL-MCNC: 8.9 MG/DL — SIGNIFICANT CHANGE UP (ref 8.4–10.5)
CHLORIDE SERPL-SCNC: 111 MMOL/L — HIGH (ref 96–108)
CO2 SERPL-SCNC: 25 MMOL/L — SIGNIFICANT CHANGE UP (ref 22–31)
CREAT SERPL-MCNC: 0.72 MG/DL — SIGNIFICANT CHANGE UP (ref 0.5–1.3)
EGFR: 115 ML/MIN/1.73M2 — SIGNIFICANT CHANGE UP
EGFR: 115 ML/MIN/1.73M2 — SIGNIFICANT CHANGE UP
EOSINOPHIL # BLD AUTO: 0.21 K/UL — SIGNIFICANT CHANGE UP (ref 0–0.5)
EOSINOPHIL NFR BLD AUTO: 2.5 % — SIGNIFICANT CHANGE UP (ref 0–6)
GLUCOSE SERPL-MCNC: 85 MG/DL — SIGNIFICANT CHANGE UP (ref 70–99)
HCT VFR BLD CALC: 27.7 % — LOW (ref 34.5–45)
HGB BLD-MCNC: 9.2 G/DL — LOW (ref 11.5–15.5)
IMM GRANULOCYTES NFR BLD AUTO: 0.9 % — SIGNIFICANT CHANGE UP (ref 0–0.9)
LDH SERPL L TO P-CCNC: 271 U/L — HIGH (ref 120–225)
LYMPHOCYTES # BLD AUTO: 2.42 K/UL — SIGNIFICANT CHANGE UP (ref 1–3.3)
LYMPHOCYTES # BLD AUTO: 28.4 % — SIGNIFICANT CHANGE UP (ref 13–44)
MAGNESIUM SERPL-MCNC: 1.8 MG/DL — SIGNIFICANT CHANGE UP (ref 1.6–2.6)
MCHC RBC-ENTMCNC: 27.6 PG — SIGNIFICANT CHANGE UP (ref 27–34)
MCHC RBC-ENTMCNC: 33.2 GM/DL — SIGNIFICANT CHANGE UP (ref 32–36)
MCV RBC AUTO: 83.2 FL — SIGNIFICANT CHANGE UP (ref 80–100)
MONOCYTES # BLD AUTO: 0.63 K/UL — SIGNIFICANT CHANGE UP (ref 0–0.9)
MONOCYTES NFR BLD AUTO: 7.4 % — SIGNIFICANT CHANGE UP (ref 2–14)
NEUTROPHILS # BLD AUTO: 5.13 K/UL — SIGNIFICANT CHANGE UP (ref 1.8–7.4)
NEUTROPHILS NFR BLD AUTO: 60.3 % — SIGNIFICANT CHANGE UP (ref 43–77)
NRBC # BLD: 0 /100 WBCS — SIGNIFICANT CHANGE UP (ref 0–0)
NRBC BLD-RTO: 0 /100 WBCS — SIGNIFICANT CHANGE UP (ref 0–0)
PLATELET # BLD AUTO: 253 K/UL — SIGNIFICANT CHANGE UP (ref 150–400)
POTASSIUM SERPL-MCNC: 4.4 MMOL/L — SIGNIFICANT CHANGE UP (ref 3.5–5.3)
POTASSIUM SERPL-SCNC: 4.4 MMOL/L — SIGNIFICANT CHANGE UP (ref 3.5–5.3)
PROT SERPL-MCNC: 6.4 G/DL — SIGNIFICANT CHANGE UP (ref 6–8.3)
RBC # BLD: 3.33 M/UL — LOW (ref 3.8–5.2)
RBC # FLD: 15.1 % — HIGH (ref 10.3–14.5)
SODIUM SERPL-SCNC: 140 MMOL/L — SIGNIFICANT CHANGE UP (ref 135–145)
URATE SERPL-MCNC: 6 MG/DL — SIGNIFICANT CHANGE UP (ref 2.5–7)
WBC # BLD: 8.51 K/UL — SIGNIFICANT CHANGE UP (ref 3.8–10.5)
WBC # FLD AUTO: 8.51 K/UL — SIGNIFICANT CHANGE UP (ref 3.8–10.5)

## 2024-09-11 PROCEDURE — 99285 EMERGENCY DEPT VISIT HI MDM: CPT

## 2024-09-11 RX ORDER — ACETAMINOPHEN 500 MG/5ML
975 LIQUID (ML) ORAL ONCE
Refills: 0 | Status: COMPLETED | OUTPATIENT
Start: 2024-09-11 | End: 2024-09-11

## 2024-09-11 RX ADMIN — Medication 975 MILLIGRAM(S): at 23:11

## 2024-09-12 VITALS
SYSTOLIC BLOOD PRESSURE: 126 MMHG | OXYGEN SATURATION: 100 % | RESPIRATION RATE: 18 BRPM | DIASTOLIC BLOOD PRESSURE: 77 MMHG | TEMPERATURE: 98 F | HEART RATE: 72 BPM

## 2024-09-12 LAB
APPEARANCE UR: CLEAR — SIGNIFICANT CHANGE UP
BILIRUB UR-MCNC: NEGATIVE — SIGNIFICANT CHANGE UP
COLOR SPEC: YELLOW — SIGNIFICANT CHANGE UP
CREAT ?TM UR-MCNC: 81 MG/DL — SIGNIFICANT CHANGE UP
DIFF PNL FLD: ABNORMAL
GLUCOSE UR QL: NEGATIVE MG/DL — SIGNIFICANT CHANGE UP
KETONES UR-MCNC: NEGATIVE MG/DL — SIGNIFICANT CHANGE UP
LEUKOCYTE ESTERASE UR-ACNC: NEGATIVE — SIGNIFICANT CHANGE UP
NITRITE UR-MCNC: NEGATIVE — SIGNIFICANT CHANGE UP
PH UR: 7 — SIGNIFICANT CHANGE UP (ref 5–8)
PROT ?TM UR-MCNC: 18 MG/DL — HIGH (ref 0–12)
PROT UR-MCNC: NEGATIVE MG/DL — SIGNIFICANT CHANGE UP
PROT/CREAT UR-RTO: 0.2 RATIO — SIGNIFICANT CHANGE UP (ref 0–0.2)
RBC CASTS # UR COMP ASSIST: 29 /HPF — HIGH (ref 0–4)
SP GR SPEC: 1.02 — SIGNIFICANT CHANGE UP (ref 1–1.03)
UROBILINOGEN FLD QL: 1 MG/DL — SIGNIFICANT CHANGE UP (ref 0.2–1)
WBC UR QL: 0 /HPF — SIGNIFICANT CHANGE UP (ref 0–5)

## 2024-09-12 PROCEDURE — 84550 ASSAY OF BLOOD/URIC ACID: CPT

## 2024-09-12 PROCEDURE — 80053 COMPREHEN METABOLIC PANEL: CPT

## 2024-09-12 PROCEDURE — 84156 ASSAY OF PROTEIN URINE: CPT

## 2024-09-12 PROCEDURE — 93970 EXTREMITY STUDY: CPT | Mod: 26

## 2024-09-12 PROCEDURE — 93005 ELECTROCARDIOGRAM TRACING: CPT

## 2024-09-12 PROCEDURE — 83735 ASSAY OF MAGNESIUM: CPT

## 2024-09-12 PROCEDURE — 82570 ASSAY OF URINE CREATININE: CPT

## 2024-09-12 PROCEDURE — 93970 EXTREMITY STUDY: CPT

## 2024-09-12 PROCEDURE — 85025 COMPLETE CBC W/AUTO DIFF WBC: CPT

## 2024-09-12 PROCEDURE — 36415 COLL VENOUS BLD VENIPUNCTURE: CPT

## 2024-09-12 PROCEDURE — 99284 EMERGENCY DEPT VISIT MOD MDM: CPT | Mod: 25

## 2024-09-12 PROCEDURE — 81001 URINALYSIS AUTO W/SCOPE: CPT

## 2024-09-12 PROCEDURE — 83615 LACTATE (LD) (LDH) ENZYME: CPT

## 2024-09-12 RX ADMIN — Medication 975 MILLIGRAM(S): at 00:11
